# Patient Record
Sex: MALE | Race: WHITE
[De-identification: names, ages, dates, MRNs, and addresses within clinical notes are randomized per-mention and may not be internally consistent; named-entity substitution may affect disease eponyms.]

---

## 2018-06-15 ENCOUNTER — HOSPITAL ENCOUNTER (EMERGENCY)
Dept: HOSPITAL 68 - ERH | Age: 53
End: 2018-06-15
Payer: SELF-PAY

## 2018-06-15 VITALS — BODY MASS INDEX: 26.64 KG/M2 | HEIGHT: 73 IN | WEIGHT: 201 LBS

## 2018-06-15 VITALS — SYSTOLIC BLOOD PRESSURE: 161 MMHG | DIASTOLIC BLOOD PRESSURE: 92 MMHG

## 2018-06-15 DIAGNOSIS — Z79.84: ICD-10-CM

## 2018-06-15 DIAGNOSIS — I50.9: Primary | ICD-10-CM

## 2018-06-15 DIAGNOSIS — I10: ICD-10-CM

## 2018-06-15 DIAGNOSIS — E11.9: ICD-10-CM

## 2018-06-15 LAB
ABSOLUTE GRANULOCYTE CT: 8.1 /CUMM (ref 1.4–6.5)
BASOPHILS # BLD: 0 /CUMM (ref 0–0.2)
BASOPHILS NFR BLD: 0.3 % (ref 0–2)
EOSINOPHIL # BLD: 0.3 /CUMM (ref 0–0.7)
EOSINOPHIL NFR BLD: 2.8 % (ref 0–5)
ERYTHROCYTE [DISTWIDTH] IN BLOOD BY AUTOMATED COUNT: 16.6 % (ref 11.5–14.5)
GRANULOCYTES NFR BLD: 82 % (ref 42.2–75.2)
HCT VFR BLD CALC: 41.5 % (ref 42–52)
LYMPHOCYTES # BLD: 1 /CUMM (ref 1.2–3.4)
MCH RBC QN AUTO: 27.2 PG (ref 27–31)
MCHC RBC AUTO-ENTMCNC: 32.3 G/DL (ref 33–37)
MCV RBC AUTO: 84.2 FL (ref 80–94)
MONOCYTES # BLD: 0.4 /CUMM (ref 0.1–0.6)
PLATELET # BLD: 314 /CUMM (ref 130–400)
PMV BLD AUTO: 7.9 FL (ref 7.4–10.4)
RED BLOOD CELL CT: 4.93 /CUMM (ref 4.7–6.1)
WBC # BLD AUTO: 9.9 /CUMM (ref 4.8–10.8)

## 2018-06-15 NOTE — ED DYSPNEA/ASTHMA COMPLAINT
History of Present Illness
 
General
Chief Complaint: Dyspnea (COPD, CHF, Other)
Stated Complaint: SOB/SWELLING TO LOWER EXTREMITIES HX CHF
Source: patient, old records
Exam Limitations: no limitations
 
Vital Signs & Intake/Output
Vital Signs & Intake/Output
 Vital Signs
 
 
Date Time Temp Pulse Resp B/P B/P Pulse O2 O2 Flow FiO2
 
     Mean Ox Delivery Rate 
 
06/15 0943      94   
 
06/15 0913 97.1 102 18 161/92  97 Room Air Room Air 
 
 
 
Allergies
Coded Allergies:
Penicillins (PER PT WAS TOLD AS A KID 16)
tetanus toxoid, adsorbed (ARM WENT STIFF, COULDNT MOVE FOR A WEEK 16)
 
Reconcile Medications
Aspirin (Aspirin*) 81 MG TAB.CHEW   1 TAB PO DAILY HEART HEALTH  (Reported)
Atorvastatin Calcium 40 MG TABLET   1 TAB PO DAILY HIGH CHOLESTEROL
Carvedilol 3.125 MG TABLET   1 TAB PO BID HEART
Finasteride (Proscar) 5 MG TABLET   1 TAB PO DAILY Urinary Retention 
Furosemide 40 MG TABLET   1 TAB PO BID WATER RETENTION  (Reported)
Lisinopril 20 MG TABLET   20 MG PO DAILY HEART HEALTH
Metformin HCl (Glucophage) 1,000 MG TABLET   1 TAB PO BID Diabetes
Metolazone 2.5 MG TABLET   1 TAB PO QOD FLUID OVER LOAD
     TAKE 1 PILL EVERY OTHER DAY
Nystatin 100,000 UNIT/GRAM CREAM..G.   1 SALVATORE TOP BID ARM  (Reported)
     apply to affected area(s)
Oxycodone HCl/Acetaminophen (Percocet 5-325 MG Tablet) 5 MG-325 MG TABLET   1 
TAB PO BID PRN pain
Potassium Chloride (K-Tab ER) 20 MEQ TABLET.ER   1 TAB PO DAILY LOW POTASSIUM
 
Triage Nurses Notes Reviewed? yes
HPI:
Patient presents with worsening shortness of breath, dyspnea on exertion and 
orthopnea.  Patient has been taking his medications as prescribed.  Patient has 
been unable to go to the CHF clinic lately because of insurance reasons.  He 
denies any chest pain or chest tightness.  There is no nausea or vomiting.
 
Past History
 
Travel History
Traveled to [place] past 21 day No
 
Medical History
Any Pertinent Medical History? see below for history
Neurological: GUILLAIN-BARRE SYNDROME
EENT: NONE
Cardiovascular: CAD, CHF, hypertension, hyperlipidemia, myocardial infarction, 
MI, ,  with stents rheumatic fever
Respiratory: pneumonia, "FLUID IN LUNGS" CHF
Gastrointestinal: NONE
Hepatic: NONE
Renal: NONE
Musculoskeletal: NONE
Psychiatric: NONE
Endocrine: diabetes
Blood Disorders: NONE
Cancer(s): NONE
GYN/Reproductive: NONE
History of MRSA: No
History of VRE: No
History of CDIFF: No
 
Surgical History
Surgical History: coronary stents post myocardial infarction  AND 
 
Psychosocial History
Who do you live with Other (see notes)
Services at Home NONE
What is your primary language English
 
Family History
Family History, If Any:
MOTHER
  FH: breast cancer
Relation not specified for:
  *No pertinent family history
 
Hx Contributory? No
 
Review of Systems
 
Review of Systems
Constitutional:
Reports: no symptoms. 
EENTM:
Reports: no symptoms. 
Respiratory:
Reports: see HPI, orthopnea, short of breath. 
Cardiovascular:
Reports: no symptoms. 
GI:
Reports: no symptoms. 
Genitourinary:
Reports: no symptoms. 
Musculoskeletal:
Reports: no symptoms. 
Skin:
Reports: no symptoms. 
Neurological/Psychological:
Reports: no symptoms. 
Hematologic/Endocrine:
Reports: no symptoms. 
Immunologic/Allergic:
Reports: no symptoms. 
All Other Systems: Reviewed and Negative
 
Physical Exam
 
Physical Exam
General Appearance: well developed/nourished, alert, awake, anxious, moderate 
distress
Head: atraumatic, normal appearance
Eyes:
Bilateral: PERRL, EOMI. 
Ears, Nose, Throat: normal pharynx, normal ENT inspection, hearing grossly 
normal
Neck: normal inspection, supple, JVD
Respiratory: rales
Cardiovascular: regular rate/rhythm, normal peripheral pulses
Gastrointestinal: normal bowel sounds, soft, non-tender, no organomegaly
Extremities: normal capillary refill, pedal edema
Neurologic/Psych: no motor/sensory deficits, awake, alert, oriented x 3, normal 
mood/affect
Skin: intact, normal color, warm/dry
 
Core Measures
ACS in differential dx? No
CVA/TIA Diagnosis No
Sepsis Present: No
Sepsis Focused Exam Completed? No
 
Progress
Differential Diagnosis: AMI, CHF, pulmonary embolism, pneumonia
Plan of Care:
 Orders
 
 
Procedure Date/time Status
 
Add-on Test (ER Only) 06/15 1042 Active
 
MAGNESIUM 06/15 0930 Complete
 
Telemetry/Cardiac Monitor 06/15 09 Active
 
TROPONIN LEVEL 06/15 09 Complete
 
COMPREHENSIVE METABOLIC PANEL 06/15 09 Complete
 
CBC WITHOUT DIFFERENTIAL 06/15 0923 Complete
 
B-TYPE NATRIURETIC PEP (BNP) 06/15 0923 Complete
 
EKG 06/15 09 Active
 
 
 Laboratory Tests
 
 
 
06/15/18 0930:
Anion Gap 13, Estimated GFR > 60, BUN/Creatinine Ratio 17.0, Glucose 148  H, 
Calcium 9.4, Magnesium 1.5  L, Total Bilirubin 1.6  H, AST 20, ALT 18  L, 
Alkaline Phosphatase 86, Troponin I 0.04, Pro-B-Natriuretic Pept 9300  H, Total 
Protein 7.2, Albumin 3.9, Globulin 3.3, Albumin/Globulin Ratio 1.2, CBC w Diff 
NO MAN DIFF REQ, RBC 4.93, MCV 84.2, MCH 27.2, MCHC 32.3  L, RDW 16.6  H, MPV 
7.9, Gran % 82.0  H, Lymphocytes % 10.4  L, Monocytes % 4.5, Eosinophils % 2.8, 
Basophils % 0.3, Absolute Granulocytes 8.1  H, Absolute Lymphocytes 1.0  L, 
Absolute Monocytes 0.4, Absolute Eosinophils 0.3, Absolute Basophils 0
 
Diagnostic Imaging:
Viewed by Me: Radiology Read.  Discussed w/RAD: Radiology Read. 
CXR Impression: PATIENT: SHINE MORA III  MEDICAL RECORD NO: [id number] PRESENT 
AGE: 52  PATIENT ACCOUNT NO: [id number] : 63  LOCATION: [facility] ORDERING 
PHYSICIAN: Bossman Tomlinson MD     SERVICE DATE: 06/15/ EXAM TYPE: RAD 
- XRY-PORTABLE CHEST XRAY EXAMINATION: XR PORTABLE CHEST CLINICAL INFORMATION: 
Pulmonary edema. COMPARISON: Chest x-ray 2017. TECHNIQUE: Portable 85 
degrees semierect frontal view of the chest was obtained. FINDINGS: The right 
hemithorax is hypoexpanded compared to the left. There is a right-sided moderate
pleural effusion, with underlying atelectasis and/or consolidation. The left 
lung appears well-expanded and clear. The cardiac silhouette is prominent. There
is mild prominence of the central pulmonary vasculature. No pneumothoraces are 
seen. The osseous structures are unremarkable. IMPRESSION: 1. There is 
cardiomegaly, and the central pulmonary vasculature is mildly prominent. 2. 
There is a right-sided pleural effusion with underlying atelectasis/
consolidation. DICTATED BY: Eliel Cavazos MD  DATE/TIME DICTATED:06/15/18 / 0955 
:RAD.CAMPBELL  DATE/TIME TRANSCRIBED:06/15/18 / 0955 CONFIDENTIAL, 
DO NOT COPY WITHOUT APPROPRIATE AUTHORIZATION.  <Electronically signed in Other 
Vendor System>                                                                  
                     SIGNED BY: Eliel Cavazos MD 06/15/18 1001
Initial ED EKG: SR, IVCD, NSSTT CHANGES, NO CHANGE FROM PRIOR
Comments:
Discussed with Dr. Stephens, he will see the patient in the emergency department 
for consultation.
 
Departure
 
Departure
Disposition: HOME OR SELF CARE
Condition: Stable
Clinical Impression
Primary Impression: CHF (congestive heart failure)
Referrals:
Zeenat Galaviz DO (PCP/Family)
 
Angelo MORAN,LEA Allen
 
Additional Instructions:
TAKE METOLAZONE EVERY OTHER DAY FOR 20 DAYS
TAKE POTASSIUM DAILY FOR 1 WEEK
FOLLOW UP WITH 
RETURN FOR ANY CONCERNS
Departure Forms:
Customer Survey
General Discharge Information
Prescriptions:
Current Visit Scripts
Metolazone 1 TAB PO QOD  
     #10 TAB 
     TAKE 1 PILL EVERY OTHER DAY
 
Potassium Chloride (K-Tab ER) 1 TAB PO DAILY  
     #7 TAB 
 
 
 
Critical Care Note
 
Critical Care Note
Critical Care Time: non-applicable

## 2018-06-15 NOTE — RADIOLOGY REPORT
EXAMINATION:
XR PORTABLE CHEST
 
CLINICAL INFORMATION:
Pulmonary edema.
 
COMPARISON:
Chest x-ray 12/01/2017.
 
TECHNIQUE:
Portable 85 degrees semierect frontal view of the chest was obtained.
 
FINDINGS:
The right hemithorax is hypoexpanded compared to the left. There is a
right-sided moderate pleural effusion, with underlying atelectasis and/or
consolidation. The left lung appears well-expanded and clear. The cardiac
silhouette is prominent. There is mild prominence of the central pulmonary
vasculature. No pneumothoraces are seen. The osseous structures are
unremarkable.
 
IMPRESSION:
1. There is cardiomegaly, and the central pulmonary vasculature is mildly
prominent.
 
2. There is a right-sided pleural effusion with underlying
atelectasis/consolidation.

## 2018-07-23 ENCOUNTER — HOSPITAL ENCOUNTER (INPATIENT)
Dept: HOSPITAL 68 - ERH | Age: 53
LOS: 4 days | DRG: 292 | End: 2018-07-27
Admitting: INTERNAL MEDICINE
Payer: COMMERCIAL

## 2018-07-23 VITALS — BODY MASS INDEX: 25.64 KG/M2 | WEIGHT: 193.5 LBS | HEIGHT: 73 IN

## 2018-07-23 DIAGNOSIS — E87.6: ICD-10-CM

## 2018-07-23 DIAGNOSIS — Z95.5: ICD-10-CM

## 2018-07-23 DIAGNOSIS — Z56.0: ICD-10-CM

## 2018-07-23 DIAGNOSIS — I25.10: ICD-10-CM

## 2018-07-23 DIAGNOSIS — I25.2: ICD-10-CM

## 2018-07-23 DIAGNOSIS — I47.2: ICD-10-CM

## 2018-07-23 DIAGNOSIS — I11.0: Primary | ICD-10-CM

## 2018-07-23 DIAGNOSIS — J90: ICD-10-CM

## 2018-07-23 DIAGNOSIS — I50.23: ICD-10-CM

## 2018-07-23 LAB
ABSOLUTE GRANULOCYTE CT: 9 /CUMM (ref 1.4–6.5)
BASOPHILS # BLD: 0 /CUMM (ref 0–0.2)
BASOPHILS NFR BLD: 0.2 % (ref 0–2)
EOSINOPHIL # BLD: 0.1 /CUMM (ref 0–0.7)
EOSINOPHIL NFR BLD: 1.2 % (ref 0–5)
ERYTHROCYTE [DISTWIDTH] IN BLOOD BY AUTOMATED COUNT: 19.6 % (ref 11.5–14.5)
GRANULOCYTES NFR BLD: 84.4 % (ref 42.2–75.2)
HCT VFR BLD CALC: 42.3 % (ref 42–52)
LYMPHOCYTES # BLD: 1 /CUMM (ref 1.2–3.4)
MCH RBC QN AUTO: 26.1 PG (ref 27–31)
MCHC RBC AUTO-ENTMCNC: 32 G/DL (ref 33–37)
MCV RBC AUTO: 81.5 FL (ref 80–94)
MONOCYTES # BLD: 0.5 /CUMM (ref 0.1–0.6)
PLATELET # BLD: 272 /CUMM (ref 130–400)
PMV BLD AUTO: 8.5 FL (ref 7.4–10.4)
RED BLOOD CELL CT: 5.19 /CUMM (ref 4.7–6.1)
WBC # BLD AUTO: 10.7 /CUMM (ref 4.8–10.8)

## 2018-07-23 PROCEDURE — 87075 CULTR BACTERIA EXCEPT BLOOD: CPT

## 2018-07-23 SDOH — ECONOMIC STABILITY - INCOME SECURITY: UNEMPLOYMENT, UNSPECIFIED: Z56.0

## 2018-07-23 NOTE — ED GENERAL ADULT
History of Present Illness
 
General
Chief Complaint: Dyspnea (COPD, CHF, Other)
Stated Complaint: SOB
Source: patient
Exam Limitations: no limitations
 
Vital Signs & Intake/Output
Vital Signs & Intake/Output
 Vital Signs
 
 
Date Time Temp Pulse Resp B/P B/P Pulse O2 O2 Flow FiO2
 
     Mean Ox Delivery Rate 
 
 2139 97.4 95 18 169/97  100 Nasal 2.0L 
 
       Cannula  
 
 1844 97.6 96 18 171/93  100 Nasal 2.5L 
 
       Cannula  
 
 1406  103 18 159/98  95 Room Air  
 
 
 
Allergies
Coded Allergies:
Penicillins (PER PT WAS TOLD AS A KID 16)
tetanus toxoid, adsorbed (ARM WENT STIFF, COULDNT MOVE FOR A WEEK 16)
 
Reconcile Medications
Aspirin (Aspirin*) 81 MG TAB.CHEW   1 TAB PO DAILY HEART HEALTH  (Reported)
Atorvastatin Calcium 40 MG TABLET   1 TAB PO DAILY HIGH CHOLESTEROL
Carvedilol 3.125 MG TABLET   1 TAB PO BID HEART
Finasteride (Proscar) 5 MG TABLET   1 TAB PO DAILY Urinary Retention 
Furosemide 40 MG TABLET   1 TAB PO BID WATER RETENTION  (Reported)
Lisinopril 20 MG TABLET   20 MG PO DAILY HEART HEALTH
Metformin HCl (Glucophage) 1,000 MG TABLET   1 TAB PO BID Diabetes
Methylprednisolone 4 MG TAB.DS.PK   1 TAB PO DAILY STEROID  (Reported)
Oxycodone HCl/Acetaminophen (Percocet 5-325 MG Tablet) 5 MG-325 MG TABLET   1 
TAB PO BID PRN pain
 
Triage Nurses Notes Reviewed? yes
Onset: Gradual
Duration: day(s):
Timing: recent history
HPI:
 
 
 
 
18
7 PM
53-year-old male presents to the emergency department complaining of progressive
difficulty breathing and leg swelling.  He says he has a history of Guillain-
Barr syndrome and congestive heart failure.  He denies any chest pain or fever.
(Gucci Martinez DO)
 
Past History
 
Medical History
Any Pertinent Medical History? see below for history
Neurological: GUILLAIN-BARRE SYNDROME
EENT: NONE
Cardiovascular: CAD, CHF, hypertension, hyperlipidemia, myocardial infarction, 
MI, ,  with stents rheumatic fever
Respiratory: pneumonia, "FLUID IN LUNGS" CHF
Gastrointestinal: NONE
Hepatic: NONE
Renal: NONE
Musculoskeletal: NONE
Psychiatric: NONE
Endocrine: diabetes
Blood Disorders: NONE
Cancer(s): NONE
GYN/Reproductive: NONE
History of MRSA: No
History of VRE: No
History of CDIFF: No
 
Surgical History
Surgical History: coronary stents post myocardial infarction  AND 
 
Psychosocial History
Who do you live with Other (see notes)
Services at Home NONE
What is your primary language English
 
Family History
Family History, If Any:
MOTHER
  FH: breast cancer
Relation not specified for:
  *No pertinent family history
 
Hx Contributory? No
(Gucci Martinez DO)
 
Review of Systems
 
Review of Systems
Constitutional:
Denies: fever. 
EENTM:
Denies: visual changes. 
Respiratory:
Reports: short of breath. 
Cardiovascular:
Denies: chest pain. 
GI:
Denies: abdominal pain. 
Genitourinary:
Reports: no symptoms. 
Musculoskeletal:
Reports: no symptoms. 
Skin:
Denies: rash. 
Neurological/Psychological:
Reports: see HPI. 
Hematologic/Endocrine:
Reports: no symptoms. 
Immunologic/Allergic:
Reports: no symptoms. 
(Gucci Martinez DO)
 
Physical Exam
 
Physical Exam
General Appearance: well developed/nourished, alert, awake, mild distress
Head: atraumatic, normal appearance
Eyes:
Bilateral: normal appearance, PERRL, EOMI. 
Ears, Nose, Throat: normal ENT inspection
Neck: normal inspection
Respiratory: decreased breath sounds, crackles (fine, at bases)
Cardiovascular: regular rate/rhythm
Peripheral Pulses:
4+ radial (R), 4+ radial (L)
Gastrointestinal: tenderness
Back: normal inspection
Extremities: bilateral edema to lower extremites
Neurologic/Psych: no motor/sensory deficits, awake, alert, oriented x 3
Skin: intact, normal color, warm/dry
 
Core Measures
ACS in differential dx? No
CVA/TIA Diagnosis: No
Sepsis Present: No
Sepsis Focused Exam Completed? No
(Gucci Martinez DO)
 
Progress
Differential Diagnoses
I considered the following diagnoses in my evaluation of the patient: [CHF, 
pulmonary embolism, acute coronary syndrome, pneumonia, COPD, pulmonary 
hypertension, bronchitis
 
Plan of Care:
 Orders
 
 
Procedure Date/time Status
 
Regular Diet  B Active
 
Patient Data 2128 Active
 
OXYGEN SETUP (GEN) 2057 Active
 
Saline Lock 2057 Active
 
Admit to inpatient 2057 Active
 
Vital Signs 2057 Active
 
Activity/Ambulation 2057 Active
 
BLOOD CULTURE 2057 Active
 
Code Status 2057 Active
 
TROPONIN LEVEL  190 Complete
 
EKG  190 Active
 
FingerStick- Glucose  1848 Active
 
Intake & Output  1528 Active
 
OXYGEN SETUP (GEN)  1450 Active
 
Telemetry/Cardiac Monitor  1450 Active
 
Saline Lock  1411 Active
 
TROPONIN LEVEL  1411 Complete
 
D-DIMER  1411 Complete
 
COMPREHENSIVE METABOLIC PANEL  1411 Complete
 
CBC WITHOUT DIFFERENTIAL  141 Complete
 
EKG  1358 Active
 
 
 Current Medications
 
 
  Sig/John Start time  Last
 
Medication Dose  Stop Time Status Admin
 
Azithromycin 500 MG ONCE ONE  2100 AC 
 
(Zithromax)   2159  
 
Sodium Chloride 250 ML    
 
(Normal Saline 0.9%)     
 
 
 Laboratory Tests
 
 
 
18 1939:
Troponin I 0.05
 
18 1600:
Anion Gap 13, Estimated GFR > 60, BUN/Creatinine Ratio 17.8, Glucose 100  H, 
Calcium 9.1, Total Bilirubin 2.5  H, AST 19, ALT 23, Alkaline Phosphatase 85, 
Troponin I 0.06, Total Protein 7.2, Albumin 4.1, Globulin 3.1, Albumin/Globulin 
Ratio 1.3, D-Dimer High Sensitivty 589  H, CBC w Diff NO MAN DIFF REQ, RBC 5.19,
MCV 81.5, MCH 26.1  L, MCHC 32.0  L, RDW 19.6  H, MPV 8.5, Gran % 84.4  H, 
Lymphocytes % 9.3  L, Monocytes % 4.9, Eosinophils % 1.2, Basophils % 0.2, 
Absolute Granulocytes 9.0  H, Absolute Lymphocytes 1.0  L, Absolute Monocytes 
0.5, Absolute Eosinophils 0.1, Absolute Basophils 0
 Microbiology
2120  BLOOD: Blood Culture - RECD
2108  BLOOD: Blood Culture - RECD
 
 
Initial ED EKG: LVH, sinus tach
Prior EKG: unchanged
Comments:
PATIENT: SHINE MORA III  MEDICAL RECORD NO: [id number]
PRESENT AGE: 53  PATIENT ACCOUNT NO: [id number]
: 63  LOCATION: [facility]
ORDERING PHYSICIAN: Gucci Martinez DO  
 
  SERVICE DATE: 18-
EXAM TYPE: RAD - XRY-CHEST XRAY, TWO VIEWS
 
EXAMINATION:
XR CHEST
 
CLINICAL INFORMATION:
Shortness of breath
 
COMPARISON:
2017 and 06/15/2018
 
TECHNIQUE:
2 views of the chest were obtained.
 
FINDINGS:
Mild cardiomegaly; also, there is generalized prominence of the pulmonary
vasculature without overt edema. Small right pleural effusion, similar
compared to 06/15/2018, associated with compressive atelectasis in the right
lower lobe and middle lobe. The visualized bones are intact.
 
IMPRESSION:
- Cardiomegaly and pulmonary vascular congestion without overt interstitial
edema.
 
- Small right pleural effusion and compressive atelectasis in right lung base
remain similar in appearance compared to 06/15/2018.
 
DICTATED BY: Wili Enriquez MD 
DATE/TIME DICTATED:18
:IRINEO 
DATE/TIME TRANSCRIBED:18
 
CONFIDENTIAL, DO NOT COPY WITHOUT APPROPRIATE AUTHORIZATION.
 
 <Electronically signed in Other Vendor System>                                 
          
                                           SIGNED BY: Wili Enriquez MD 18
9455
 
 
 
(Gucci Martinez DO)
Diagnostic Imaging:
Viewed by Me: CT Scan.  Discussed w/RAD: CT Scan. 
Radiology Impression: 1. Consolidations involving the right middle lobe and 
right lower lobe limit evaluation for pulmonary embolus in these regions. No 
pulmonary emboli are identified outside the lung lobes. 2. Moderate right 
pleural effusion. The consolidations within the right middle lobe and right 
lower lobe may be at least partially on the basis of compression although the 
patchy nature and air bronchograms suggest there may been an underlying 
infectious process. This appears very similar when compared with the prior study
from 2016. 3. Stable mediastinal lymphadenopathy. 4. Previously described 
pulmonary nodules are poorly evaluated due to the underlying lung parenchymal 
disease on the current study. There is apparent increasing size of a left 
anterior upper lobe nodule. Recommend dedicated chest CT once the patient is 
recovered from this acute episode to assess the previously described pulmonary 
nodules. 5. Unchanged calcification within the left ventricle, of uncertain 
clinical significance or etiology. VTE: negative.
CXR Impression:  Cardiomegaly and pulmonary vascular congestion without overt 
interstitial edema. - Small right pleural effusion and compressive atelectasis 
in right lung base remain similar in appearance compared to 06/15/2018.
Comments:
 
Updated patient with diagnostics, CTA.  He is refusing hospitalization at this 
time citing lack of insurance.
After speaking with his mother he decided it would be in his best interest to 
stay for treatment of pneumonia, pleural effusion and CHF.
(Scott MORAN,Vitaliy)
 
Departure
 
Departure
Disposition: STILL A PATIENT
Condition: Stable
Referrals:
Zeenat Galaviz DO (PCP/Family)
 
Departure Forms:
Customer Survey
General Discharge Information
Comments
 
 
 
 
The patient's chest x-ray shows mild pulmonary vascular congestion, the symptoms
were progressive.  No chest pain.  No fever.  He does have significant lower 
extremity edema.  40 mg of IV Lasix was given.  He had an elevated d-dimer and 
so CTA was obtained.  He does not want to be admitted to the hospital.  IV 
potassium was given.  Repeat troponin and EKG were ordered.  The patient was 
signed out to Dr. Chavez at 7 PM.
(Gucci Martinez DO)
 
Departure
Clinical Impression
Primary Impression: Pneumonia
Secondary Impressions: CHF (congestive heart failure), Hypokalemia, Pleural 
effusion associated with pulmonary infection
 
Admission Note
Spoke With:
Sayda Chung MD
Documentation of Exam:
Documentation of any treatments & extenuating circumstances including Concerns 
Regarding Discharge (functional status, medication knowledge or non-compliance, 
living conditions, etc.) that warrant an admission rather than observation: IV 
antibiotics IV diuresis follow blood cultures serial lab exam cardiac monitoring
education adjustment cardiology evaluation pulmonary evaluation continuing care 
discharge planning
 
(Vitaliy Chavez MD)
 
Critical Care Note
 
Critical Care Note
Critical Care Time: non-applicable
(Gucci Martinez DO)
 
Critical Care Note
Critical Care Time: 30-74 min (35)
(Vitaliy Chavez MD)
 
ED Attending Observation
Initial Observation Note:
I have seen and personally examined SHINE MORA III 
on 18 at 1559. 
 
I agree with the current emergency department documentation.  The disposition 
(admission or discharge) is uncertain at this time, he needs a period of 
observation for the following reason(s): 
 
The ED Nurse caring for this patient has been personally informed as to what the
patient is being observed for.
 
(Gucci Martinez DO)

## 2018-07-23 NOTE — HISTORY & PHYSICAL
Rakesh Saini 07/23/18 2242:
General Information and HPI
MD Statement:
I have seen and personally examined SHINE MORA III and documented this H&P.
 
The patient is a 53 year old M who presented with a patient stated chief 
complaint of [shortness of breath].
 
Source of Information: patient
Exam Limitations: no limitations
History of Present Illness:
The patient is a 53-year-old male who is here with past medical history of COPD,
MI (2000 9-2014 with a stent placement), CHF (LVEF 25-30%), diabetes mellitus, 
with chief complaint of dyspnea and shortness of breath and progressive swelling
in lower extremities bilaterally.
He felt relatively well until 4 days ago when looked shortness of breath and he 
was not able to go to sleep for the last 3 nights due to orthopnea.  During the 
past day he had severe dyspnea and orthopnea and he also has shortness of breath
at rest while he is sitting.  He reports that he has central pressure-like chest
pain which is 4/10 while he is sleeping and worsens when he lies back to a score
of 8/10.  The pain is dull ache when he is sitting but when he lies down it 
becomes pressure-like tightness in the center of his chest.  
He has been having a productive cough with white foamy phlegm.  He also 
complains about periumbilical abdominal pain.  He reports lower extremity 
swelling and in the past few days it has been aggravating and he has been 
feeling pain in his shins bilaterally. 
Regarding his chest pain he says that the pain and its quality reminds him of 
his last MI that he had in 2014. 
His cardiologist has suggested defibrillator team but he has rejected it.
During the interview he was experiencing some pain along the IV line in his 
right arm due to the potassium infusion.
He reports palpitation and heartburn.
He reports no fever, chills, hemoptysis, chest pain, dizziness, lightheadedness.
 
Past medical history:
Coronary artery disease, CHF, hypertension, hyperlipidemia, myocardial 
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
, diabetes mellitus (on metformin), rheumatic heart fever
 
Family history:
Mother had a history of breast cancer otherwise not significant
 
Past surgical history:
Stent placement post MI 2009 and 2014
 
Allergies:
Penicillin (he was told he is allergic to penicillin as a child)
Tetanus toxoid (he could not move for a week after injection)
 
Social history:
He lives alone at home with his cat.  He used to smoke more than 1 PPD 
cigarettes and he quit a couple of years ago.  He drinks occasionally and denies
using recreational drugs
 
Labs:
D-dimer: 589
Sodium: 142
Potassium: 3.0
Calcium: 9.1
Total bilirubin: 2.5
Troponin: 0.06
 
EKG:
Previous: Previous EKG showed a heart rate of 100 beats axis deviation to the 
left side LVH, RBBB, anterior fascicular block, IVCD,
Today's EKG at 14: 03 showed sinus tachycardia 104, otherwise no changes were 
observed
Today's EKG at 19: 48, showed no changes comparing to the previous EKG
 
Imaging:
Chest x-ray: Chest x-ray showed cardiomegaly and pulmonary vascular congestion 
without overt interstitial edema
Chest CT:1. Consolidations involving the right middle lobe and right lower lobe 
limit evaluation for pulmonary embolus in these regions. No pulmonary emboli are
identified outside the lung lobes. 2. Moderate right pleural effusion. The 
consolidations within the right middle lobe and right lower lobe may be at least
partially on the basis of compression although the patchy nature and air 
bronchograms suggest there may been an underlying infectious process. This 
appears very similar when compared with the prior study from 11/25/2016. 3. 
Stable mediastinal lymphadenopathy. 4. Previously described pulmonary nodules 
are poorly evaluated due to the underlying lung parenchymal disease on the 
current study. There is apparent increasing size of a left anterior upper lobe 
nodule. Recommend dedicated chest CT once the patient is recovered from this 
acute episode to assess the previously described pulmonary nodules. 5. Unchanged
calcification within the left ventricle, of uncertain clinical significance or 
etiology.
VTE: negative.
 
 
Allergies/Medications
Allergies:
Coded Allergies:
Penicillins (PER PT WAS TOLD AS A KID 06/22/16)
tetanus toxoid, adsorbed (ARM WENT STIFF, COULDNT MOVE FOR A WEEK 08/09/16)
 
Home Med list
Aspirin (Aspirin*) 81 MG TAB.CHEW   1 TAB PO DAILY HEART HEALTH  (Reported)
Atorvastatin Calcium 40 MG TABLET   1 TAB PO DAILY HIGH CHOLESTEROL
Carvedilol 3.125 MG TABLET   1 TAB PO BID HEART
Finasteride (Proscar) 5 MG TABLET   1 TAB PO DAILY Urinary Retention 
Furosemide 40 MG TABLET   1 TAB PO BID WATER RETENTION  (Reported)
Lisinopril 20 MG TABLET   20 MG PO DAILY HEART HEALTH
Metformin HCl (Glucophage) 1,000 MG TABLET   1 TAB PO BID Diabetes
Methylprednisolone 4 MG TAB.DS.PK   1 TAB PO DAILY STEROID  (Reported)
Oxycodone HCl/Acetaminophen (Percocet 5-325 MG Tablet) 5 MG-325 MG TABLET   1 
TAB PO BID PRN pain
 
 
Past History
 
Travel History
Traveled to [place] past 21 day No
 
Medical History
Neurological: GUILLAIN-BARRE SYNDROME
EENT: NONE
Cardiovascular: CAD, CHF, hypertension, hyperlipidemia, myocardial infarction, 
MI, 2009, 2014 with stents rheumatic fever
Respiratory: pneumonia, "FLUID IN LUNGS" CHF
Gastrointestinal: NONE
Hepatic: NONE
Renal: NONE
Musculoskeletal: NONE
Psychiatric: NONE
Endocrine: diabetes
Blood Disorders: NONE
Cancer(s): NONE
GYN/Reproductive: NONE
History of MRSA: No
History of VRE: No
History of CDIFF: No
 
Surgical History
Surgical History: coronary stents post myocardial infarction 2009 AND 2014
 
Past Family/Social History
 
Family History
Relations & Conditions if any
MOTHER
  FH: breast cancer
Relation not specified for:
  *No pertinent family history
 
 
Psychosocial History
Who Do You Live With? roommates
Services at Home: NONE
 
Review of Systems
 
Review of Systems
Constitutional:
Reports: see HPI.  Denies: chills, fever. 
EENTM:
Reports: see HPI.  Denies: visual changes. 
Cardiovascular:
Reports: see HPI, orthopena, palpitations, peripheral edema. 
Respiratory:
Reports: see HPI, cough, short of breath, sputum production. 
GI:
Reports: see HPI, abdominal pain. 
Genitourinary:
Reports: see HPI. 
Musculoskeletal:
Reports: see HPI. 
Skin:
Reports: see HPI. 
Neurological/Psychological:
Reports: see HPI. 
Hematologic/Endocrine:
Reports: see HPI. 
Immunologic/Allergic:
Reports: see HPI. 
 
Exam & Diagnostic Data
Last 24 Hrs of Vital Signs/I&O
 Vital Signs
 
 
Date Time Temp Pulse Resp B/P B/P Pulse O2 O2 Flow FiO2
 
     Mean Ox Delivery Rate 
 
07/23 2309  100 18 163/9  99 Nasal 2.0L 
 
       Cannula  
 
07/23 2139 97.4 95 18 169/97  100 Nasal 2.0L 
 
       Cannula  
 
07/23 1844 97.6 96 18 171/93  100 Nasal 2.5L 
 
       Cannula  
 
07/23 1406  103 18 159/98  95 Room Air  
 
 
 Intake & Output
 
 
 07/24 0800 07/24 0000 07/23 1600
 
Intake Total   
 
Output Total  1000 600
 
Balance  -1000 -600
 
    
 
Output, Urine  1000 600
 
Patient   190 lb
 
Weight   
 
Weight   Reported by Patient
 
Measurement   
 
Method   
 
 
 
 
Physical Exam
General Appearance Alert, Oriented X3, Cooperative, Mild Distress
Skin No Rashes, No Breakdown, No Significant Lesion
Skin Temp/Moisture Exam: Warm/Dry
Sepsis Skin Exam (color): Normal for Ethnicity
HEENT Atraumatic, PERRLA, EOMI
Neck Supple, No LAD, JVD 8 cmH2O
Cardiovascular Regular Rate, Normal S1, Normal S2
Lungs decreased breathing sounds on the lower half of right lung with bilateral 
rales in lower lobes more on right
Abdomen Normal Bowel Sounds, Soft, No Tenderness
Neurological Normal Speech, Strength at 5/5 X4 Ext, Normal Tone, Sensation 
Intact, Cranial Nerves 3-12 NL
Extremities No Clubbing, No Cyanosis, Normal Pulses, 4+ pitting edema on both 
lower extremities with tightness, up to the level of knees  knees
Vascular Normal Pulses, Pulses Symmetrical
Sepsis Peripheral Pulse Location: Radial
Sepsis Peripheral Pulse Exam: Normal
Sepsis Cap Refill Exam: <2 Sec
 
Assessment/Plan
Assessment:
The patient is a 53-year-old man with significant past medical history for 2 
episodes of MI with a stent placement, heart failure with low ejection fraction,
who presents to the ED due to aggravated shortness of breath and lower extremity
edema.
During the examination the patient had orthopnea and he reports that he had 
worsening orthopnea for the past 3 days.  In physical examination he was seen a 
stress and shortness of breath while sitting on the bed.  He had a JVP of 8 cm, 
bilateral crackles, and 4+ pitting bilateral lower extremity edema.  His lab 
results showed white BC of 10.7, low potassium of 3.0, troponin levels of 0.05 
and 0.06, d-dimer was 589 point chest imaging showed right pleural effusion, no 
pulmonary embolism, and congestion of pulmonary vasculature.  No acute changes 
are observed on EKG comparing to the previous EKG.
Based on history and physical examination the patient seems to have acute on 
chronic heart failure with reduced ejection fraction which might be induced by 
pneumonia and pleural effusion.  The patient has dyspnea and orthopnea at rest 
and is classified as NYHA class IV.  The patient is admitted to telemetry for 
close monitoring serial EKGs and troponins, consult with cardiologist, empiric 
antibiotic for possible pneumonia which is underlying cause of his condition and
presence of his heart failure.
 
Problem list:
Acute on chronic heart failure with low ejection fraction
Possible pneumonia with right-sided pleural effusion
4+ lower extremity pitting edema
Multiple episodes of CAD with stent placement
Reduced ejection fraction of 30-35 with heart failure
Limited access to medical care due to insurance problems
Hypertension
Hyperlipidemia
Diabetes mellitus
 
As Ranked By This Provider
Problem List:
 1. HFrEF (heart failure with reduced ejection fraction)
 
 2. Pleural effusion associated with pulmonary infection
 
 3. CAD (coronary artery disease)
 
 4. Hypertension
 
 5. Diabetes mellitus
 
 
Core Measures/Misc (9/17)
 
Acute Coronary Syndrome
ACS Diagnosis: No
 
Congestive Heart Failure
Congestive Heart Failure Diagnosis Yes
Last Known EF % 30
 
Cerebrovascular Accident
CVA/TIA Diagnosis: No
 
VTE (View Protocol)
VTE Risk Factors Age>40
No Mechanical VTE Prophylaxis d/t N/A MechProphylax Ordered
No VTE Pharm Prophylaxis d/t NA PharmProphylax ordered
 
Sepsis (View protocol)
Sepsis Present: No
If YES complete Sepsis Event Note If YES complete Sepsis Event Note
 
 
Tesfaye Francois MD 07/23/18 2243:
Core Measures/Misc (9/17)
 
Sepsis (View protocol)
If YES complete Sepsis Event Note If YES complete Sepsis Event Note
 
Resident Review Statement
Resident Statement: examined this patient, discussed with intern, agreed with 
intern, reviewed EMR data (avail)
Other Findings:
History of present illness
53-year-old man with past medical history of CAD/MI status post PCI (2009, 2014,
2016) with 8X JUDITH most recently 2x LAD and 2x 2 RCA, HFrEF (LVEF 30-35%), 
diabetes mellitus, hypertension, hyperlipidemia, and Guillain-Barr syndrome 
seen for evaluation of progressively worsening shortness of breath and lower 
extremity swelling.
 
Patient reports over the past 3 days he has been progressively more short of 
breath and has been unable to lie flat.  When he does he is not able to breathe 
and develops a moderate central chest discomfort.  At baseline he is able to 
ambulate at least 50 feet without developing any shortness of breath or chest 
pain.  Over the past 3 days he has been unable to ambulate even short distances 
without symptoms which now occur at even rest.  He admits to a new productive 
cough of white foamy phlegm.  For further evaluation of these complaints he came
to the [facility] ED.
 
Presently he admits to 4/10 nonradiating central chest pressure that is worse 
when he lies flat increasing to 8/10 described as a dull ache without any 
aggravating/alleviating factors.  He reports that he is compliant with his 
medications but does not follow up with his cardiologist Dr. Stephens as he has 
insurance issues.  Additionally he admits to some vague abdominal pain and pain 
in his lower extremities.
 
Review of systems
He otherwise denies any headache, fever, chills, vision change, lightheadedness,
dizziness, heartburn, palpitations, nausea, vomiting, diarrhea, constipation, 
urinary symptoms, numbness, tingling, or weakness.
 
Objective
Vitals-97.4-97.6, HR , RR 18, -171/93-98, O2 % on 2.0 L via 
nasal cannula
Physical exam
-General: Ill-appearing middle-aged  man in no acute distress
-HEENT: NCAT, PERRLA, EOMI, anicteric sclera, moist mucous membranes
-Neck: Supple, JVP ~8 cm H2O, trachea midline, no accessory respiratory muscle 
use
-Cardio: 2/6 systolic murmur; regular rate and rhythm
-Pulmonary: Diminished right lung field airflow with crackles up to upper chest
-Abdomen: Soft, mildly distended, nontender, bowel sounds intact
-Neuro: Awake and alert, cranial nerves II through XII grossly intact
-Extremities: 4+ bilateral lower extremity pitting edema, normal pulses
 
Labs/imaging/studies
-CBC: WBC 10.7, hemoglobin 13.9, hematocrit 42.3, platelet 272
-BMP: Sodium 142, potassium 3.0, chloride 99, CO2 30, BUN 16, creatinine 0.9, 
anion gap 13, glucose 100
-LFT: T bili 2.5, otherwise within normal limits
-Miscellaneous: Troponin I 0.05/0.06, d-dimer 589
-EKG 6/15/18: NSR, LAFB, RBBB
-EKG 7/23/18: Sinus tachycardia, LAFB, RBBB
-Echocardiogram 12/14/16: LVEF 30-35% without regional wall motion abnormalities
, moderate tricuspid regurgitation, mild pulmonary hypertension
-CXR:
* Cardiomegaly and pulmonary vascular congestion without overt interstitial
 edema.
* Small right pleural effusion and compressive atelectasis in right lung base
 remain similar in appearance compared to 06/15/2018.
-CTA chest with PE protocol:
 1. Consolidations involving the right middle lobe and right lower lobe limit
 evaluation for pulmonary embolus in these regions. No pulmonary emboli are
 identified outside the lung lobes. 
 2. Moderate right pleural effusion. The consolidations within the right
 middle lobe and right lower lobe may be at least partially on the basis of
 compression although the patchy nature and air bronchograms suggest there may
 been an underlying infectious process. This appears very similar when
 compared with the prior study from 11/25/2016. 
 3. Stable mediastinal lymphadenopathy. 
 4. Previously described pulmonary nodules are poorly evaluated due to the
 underlying lung parenchymal disease on the current study. There is apparent
 increasing size of a left anterior upper lobe nodule. Recommend dedicated
 chest CT once the patient is recovered from this acute episode to assess the
 previously described pulmonary nodules.
 5. Unchanged calcification within the left ventricle, of uncertain clinical
 significance or etiology.
 VTE: negative.
 
Assessment
53-year-old man with multiple medical problems significant for an extensive 
cardiovascular history including multiple PCI with drug-eluting stents and heart
failure with reduced ejection fraction seen for evaluation of progressively 
worsening shortness of breath now present at rest with lower extremity swelling.
 
Presently patient feels short of breath at rest with mild central nonradiating 
chest pain.  Vital signs are significant for elevated systolic blood pressure 
ranging 159-171.  Physical exam reveals an ill-appearing middle-aged man with 
elevated JVP, pulmonary crackles, and 4+ bilateral lower extremity swelling.  
Significant labs include WBC 10.7, K3.0, troponin I 0.05/0.06, d-dimer 589.  CXR
demonstrates cardiomegaly with pulmonary vascular congestion and a small right 
pleural effusion however a CTA chest with PE protocol demonstrates a right 
middle lobe/right lower lobe consolidation with moderate sized right pleural 
effusion and no pulmonary embolism.  EKG has no acute change from previous.  
Patient received ceftriaxone and azithromycin after blood cultures were drawn in
the ED in addition to 40 mg of IV Lasix.
 
Clinically patient appears to have acute on chronic heart failure with reduced 
ejection fraction in the context of RML/RLL pneumonia with moderate right 
pleural effusion.  Patient now meets criteria for New York Heart Association 
class IV heart failure with symptoms at rest and a last recorded LVEF ranging 30
-35%.  Patient has reportedly declined a defibrillator in the past.  He may 
benefit from an aldosterone antagonist.  Patient has known coronary artery 
disease and is at high risk patient for further acute coronary events given his 
multiple medical comorbidities and extensive coronary artery calcifications. 
Patient is being admitted to the telemetry floor for telemetry monitoring, 
serial troponin/EKG, echocardiogram, intravenous antibiotics and diuretics, and 
cardiology consultation.
 
Problem List
-Acute on chronic heart failure with reduced ejection fraction
-Community Acquired pneumonia
-Moderate Right pleural effusion
-Chest pain, likely due to hypervolemia
-Hypokalemia
-0.6 cm left upper lobe pulmonary nodule, previously 0.3 cm
-0.4 cm left lower lobe nodule, stable
-CAD/MI/PCI (2009, 2014, 2016) status post JUDITH 8
-History of HFrEF, LVEF 30-35% (2016)
-diabetes mellitus, previous HbA1c 14.9 currently not on insulin
-Hypertension
-Hyperlipidemia
-History of Guillain-Barr syndrome
 
Plan
-Admit to telemetry floor
-Telemetry monitoring
-Strict I&Os, daily weight
-Total respiratory care
-Supplemental oxygen, goal > 92% taper as tolerated
-Accuchecks TIDAC/HS, Q6H while NPO
-Lasix 40 mg IV BID, titrate to net negative 1-2L/day
-Ceftriaxone 1 g IV Daily
-Azithromycin 500 mg IV Daily
-Continue home meds: atorvastatin, carvedilol, finasteride, lisinopril, 
oxycodone
-Hold aspirin for thoracentesis, restart post procedure
-Consult with Cardiology for CHF
-Follow up cultures & sensitivites
-Obtain sputum culture
-Trend troponin / EKG until peak or three negative sets
-Transthoracic echocardiogram
-Check lipid panel, HbA1c, TSHR
-Right sided thoracentesis in AM
-Pain control with acetaminophen
-CHF Diet, NPO for possible thoracentesis in AM
-DVT PPx with subcutaneous heparin
-FULL CODE
 
 
Sayda Chung MD 07/23/18 2327:
Core Measures/Misc (9/17)
 
Sepsis (View protocol)
If YES complete Sepsis Event Note If YES complete Sepsis Event Note
 
Attending MD Review Statement
 
Attending Statement
Attending MD Statement: examined this patient, discuss w/resident/PA/NP, agreed 
w/resident/PA/NP, reviewed EMR data (avail)
Attending Assessment/Plan:
53M PMH CAD w/ MI X2 s/p PCI with JUDITH x4, HFrEF (LVEF 25-30%), T2DM, HLD, HTN, 
guillain barr syndrome presenting with bilateral lower extremity edema and 
shortness of breath progressively worsening for the past few days.  No fever, 
cough, hemoptysis (has a history), or chest pain.  EKG NSR, potassium 3.0, 
normal renal function.  CXR shows pulmonary vascular congestion, CTA chest shows
right sided effusion with RLL and RML infiltrate, consistent with prior CTs.  
Had a thoracentesis a year ago for similar.  
 
1. Acute on chronic systolic CHF
2. Right recurrent pleural effusion
3. Hypokalemia
 
Will admit to telemetry, IV diuresis, pulmonary and cardiology consults, replete
potassium, hold antibiotics for now, sputum culture, continue home medications, 
DVT PPx.

## 2018-07-23 NOTE — ADMISSION CERTIFICATION
Admission Certification
 
Certification Statement
- As attending physician, I certify that at the time of
- admission, based on clinical presentation, severity of
- symptoms, need for further diagnostic testing and
- therapeutic interventions, and risk of adverse outcomes
- without in-hospital treatment, in my clinical assessment,
- this patient requires an acute hospital stay for a minimum
- of two nights or longer. I have also considered psychsocial
- factors such as support system, advanced age, financial
- issues, cognitive issues, and failed out-patient treatments,
- past re-admission history, safety of patient, and lack of
- compliance as applicable.
Specific rationale supporting this admission is:
Acute CHF with moderate pleural effusion

## 2018-07-23 NOTE — RADIOLOGY REPORT
EXAMINATION:
XR CHEST
 
CLINICAL INFORMATION:
Shortness of breath
 
COMPARISON:
12/01/2017 and 06/15/2018
 
TECHNIQUE:
2 views of the chest were obtained.
 
FINDINGS:
Mild cardiomegaly; also, there is generalized prominence of the pulmonary
vasculature without overt edema. Small right pleural effusion, similar
compared to 06/15/2018, associated with compressive atelectasis in the right
lower lobe and middle lobe. The visualized bones are intact.
 
IMPRESSION:
- Cardiomegaly and pulmonary vascular congestion without overt interstitial
edema.
 
- Small right pleural effusion and compressive atelectasis in right lung base
remain similar in appearance compared to 06/15/2018.

## 2018-07-23 NOTE — CT SCAN REPORT
EXAMINATION:
CT ANGIOGRAM CHEST WITH AND WITHOUT CONTRAST (CT PULMONARY ANGIOGRAM FOR PE)
 
CLINICAL INFORMATION:
Shortness of breath. Elevated D-dimer.
 
COMPARISON:
04/24/2017, 11/25/2016
 
TECHNIQUE:
Prior to contrast administration, noncontrast localization images were
obtained. Subsequently, multidetector volumetric imaging was performed from
the thoracic inlet to below the diaphragms following the administration of 95
mL Optiray 320 intravenous contrast.
No contrast reaction reported.
Sagittal, coronal, and MIP oblique sagittal reformatted images were obtained
on the CT workstation, uploaded to PACS, and reviewed.
 
Total exam dose-length product 575.22 mGy-cm.
 
FINDINGS:
 
QUALITY OF STUDY/CONTRAST BOLUS: Satisfactory
 
PULMONARY ARTERIES: Limited evaluation of the pulmonary arteries within the
right middle lobe and right lower lobe due to consolidation of the lung. No
definite pulmonary emboli are identified.
 
THORACIC AORTA: No aneurysm or dissection.
 
LUNG: Moderate right pleural effusion. There is near complete consolidation
of the right middle lobe with air bronchograms. The left lower lung is nearly
completely consolidated, also with small air bronchograms. The consolidation
is patchy. Mosaic attenuation of the aerated portions of lung bilaterally
with septal thickening.
 
A few scattered calcified granulomas. Many of the previously described
noncalcified pulmonary nodules aren't able to be characterized on the current
exam due to the consolidations. There is an increasing area of nodularity in
the subpleural left upper lobe anteriorly now measuring 0.6 cm, previously
0.3 cm. Stable 0.4 cm pulmonary nodule posterolateral left lower lobe (series
3 image [id number]).
 
PLEURA: Moderate right pleural effusion. No left pleural effusion. No
pneumothorax bilaterally.
 
MEDIASTINUM: Cardiomegaly. No pericardial effusion. Enlarged mediastinal
lymph nodes, for example there is a pretracheal node measuring 1.3 x 1.5 cm.
A second pretracheal node measures 1.1 x 2.5 cm. These findings are
unchanged. Extensive coronary artery calcifications with likely stent
placements. Unchanged calcification within the left ventricle, possibly on a
chordae tendineae. This is of uncertain clinical significance. The trachea is
midline and central airways are patent. Limited views of the thyroid gland
are unremarkable. No evidence of septal bowing or right heart strain.
 
CHEST WALL/AXILLA: No axillary or internal mammary lymphadenopathy.
 
OSSEOUS STRUCTURES: No acute or suspicious osseous abnormality.
 
UPPER ABDOMEN: Unremarkable. No reflux of contrast into the hepatic veins to
suggest elevated right heart pressures.
 
IMPRESSION:
1. Consolidations involving the right middle lobe and right lower lobe limit
evaluation for pulmonary embolus in these regions. No pulmonary emboli are
identified outside the lung lobes.
 
2. Moderate right pleural effusion. The consolidations within the right
middle lobe and right lower lobe may be at least partially on the basis of
compression although the patchy nature and air bronchograms suggest there may
been an underlying infectious process. This appears very similar when
compared with the prior study from 11/25/2016.
 
3. Stable mediastinal lymphadenopathy.
 
4. Previously described pulmonary nodules are poorly evaluated due to the
underlying lung parenchymal disease on the current study. There is apparent
increasing size of a left anterior upper lobe nodule. Recommend dedicated
chest CT once the patient is recovered from this acute episode to assess the
previously described pulmonary nodules.
 
5. Unchanged calcification within the left ventricle, of uncertain clinical
significance or etiology.
 
VTE: negative.

## 2018-07-24 VITALS — SYSTOLIC BLOOD PRESSURE: 108 MMHG | DIASTOLIC BLOOD PRESSURE: 80 MMHG

## 2018-07-24 VITALS — SYSTOLIC BLOOD PRESSURE: 112 MMHG | DIASTOLIC BLOOD PRESSURE: 70 MMHG

## 2018-07-24 VITALS — SYSTOLIC BLOOD PRESSURE: 136 MMHG | DIASTOLIC BLOOD PRESSURE: 88 MMHG

## 2018-07-24 LAB
ABSOLUTE GRANULOCYTE CT: 7 /CUMM (ref 1.4–6.5)
BASOPHILS # BLD: 0 /CUMM (ref 0–0.2)
BASOPHILS NFR BLD: 0.1 % (ref 0–2)
EOSINOPHIL # BLD: 0.3 /CUMM (ref 0–0.7)
EOSINOPHIL NFR BLD: 3.2 % (ref 0–5)
ERYTHROCYTE [DISTWIDTH] IN BLOOD BY AUTOMATED COUNT: 19 % (ref 11.5–14.5)
GRANULOCYTES NFR BLD: 81.6 % (ref 42.2–75.2)
HCT VFR BLD CALC: 37.9 % (ref 42–52)
LYMPHOCYTES # BLD: 0.9 /CUMM (ref 1.2–3.4)
MCH RBC QN AUTO: 26 PG (ref 27–31)
MCHC RBC AUTO-ENTMCNC: 32.1 G/DL (ref 33–37)
MCV RBC AUTO: 81.1 FL (ref 80–94)
MONOCYTES # BLD: 0.4 /CUMM (ref 0.1–0.6)
PLATELET # BLD: 203 /CUMM (ref 130–400)
PMV BLD AUTO: 8.2 FL (ref 7.4–10.4)
RED BLOOD CELL CT: 4.68 /CUMM (ref 4.7–6.1)
WBC # BLD AUTO: 8.6 /CUMM (ref 4.8–10.8)

## 2018-07-24 PROCEDURE — 0W993ZZ DRAINAGE OF RIGHT PLEURAL CAVITY, PERCUTANEOUS APPROACH: ICD-10-PCS

## 2018-07-24 NOTE — PN- HOUSESTAFF
Isabel Vicente 18 0722:
Subjective
Follow-up For:
Acute on Chronic CHF-NYHA Class IV, Rt sided pleural effusion, CAP
Complaints: SOB on ambulating
Subjective:
Pt examined sitting in bed. States that SOB is better. But still complains of a 
chest tightness (4/10)that is ongoing for the past 5 days, better than at time 
of presentation. No acut events. 
 
Review of Systems
Constitutional:
Reports: see HPI. 
 
Objective
Last 24 Hrs of Vital Signs/I&O
 Vital Signs
 
 
Date Time Temp Pulse Resp B/P B/P Pulse O2 O2 Flow FiO2
 
     Mean Ox Delivery Rate 
 
2227 98.1 84 20 112/70  100   
 
 2219  88  108/80     
 
 2201       Nasal 3.0L 
 
       Cannula  
 
 2108       Nasal 3.0L 
 
       Cannula  
 
 1643      96 Nasal 2.0L 
 
       Cannula  
 
 1615 97.5 84 18 136/88  96 Nasal 2.0L 
 
       Cannula  
 
 1052 97.7 76 18 139/81     
 
 1052 97.7 76 18 139/81     
 
 1040   18   94 Nasal 2.0L 
 
       Cannula  
 
 1000   18   95 Room Air  
 
 0800      95 Nasal 2.0L 
 
       Cannula  
 
 0711 97.7 76 18 139/81  100 Nasal 3.0L 
 
       Cannula  
 
 0606 98.3 76 18 136/79  99 Nasal 2.0L 
 
       Cannula  
 
 0418      99 Nasal 2.0L 
 
       Cannula  
 
 
 Intake & Output
 
 
  0800  0000  1600
 
Intake Total   
 
Output Total   1460
 
Balance   -1460
 
    
 
Output, Other   1160
 
Output, Urine   300
 
Patient  192 lb 
 
Weight   
 
 
 
 
Physical Exam
General Appearance: Alert, Oriented X3, Cooperative, No Acute Distress
Skin: No Rashes, No Breakdown, No Significant Lesion
Skin Temp/Moisture Exam: Cool/Dry
HEENT: Atraumatic
Neck: Supple
Cardiovascular: Regular Rate, Normal S1, Normal S2, No Murmurs
Lungs: b/l decreased breath sounds, B/l mild crackles
Abdomen: Normal Bowel Sounds, Soft, Rt hypochondrial tenderness and mass on 
palapation
Neurological: Normal Gait, Normal Speech, Strength at 5/5 X4 Ext, Normal Tone
Extremities: No Clubbing, No Cyanosis, b/l l/l edema 
 
Assessment/Plan
Assessment:
53-year-old man with PMHx of CAD/MI/PCI with 8 stent placements, heart failure 
with low ejection fraction, HTN, HLD, NIDDM, who presented to the ED due to 
worsening of shortness of breath and lower extremity edema X 3days.
Vitals: stable. He is isating well on RA(off O2 now)
Problem list:
#Acute on chronic heart failure with low ejection fraction of 30-35% (NYHA Class
IV)- Induced by possible pneumonia with right-sided pleural effusion
#RML and RLL consolidation, Rt pleural effusion on imaging
#lower extremity pitting edema
#hypokalemia
#Multiple episodes of CAD with stent placement
#Limited access to medical care due to social security problems/unemployed
#Hypertension
#Hyperlipidemia
#Diabetes mellitus
 
Plan:
#RML and RLL consolidation, Rt pleural effusion on imaging: 
-Pt will undergo diagnostic and therapeutic thoracentesis today: 1.1 L fluid was
obtained 
- follow up results of Gram stain and Cx
-Lauro ceftriaxone and azithromycin empirically
 
#Acute on chronic HRrEF:
-Lasix 40 mg IV BID
-Daily weights, I/Os
 
#Hypokalemia:
-now resolved
-keep a check on K and Mag
-replete if necessary
 
#HTN:
-Lauro. Lisinopril 20 mg, and lasix
 
#DM:
-accuchecks and sliding scale insulin
 
Diet:Carb consistent
DVT PX: ALPS, S/c heparin
Code: full code
Problem List:
 1. Hypertension
 
 2. Hyperlipidemia
 
 3. Acute exacerbation of CHF (congestive heart failure)
 
 4. Pulmonary nodule
 
 5. Diabetes mellitus
 
 6. Lung consolidation
 
 7. Pleural effusion
 
Pain Ratin
Pain Location:
substernal chest tightness/pain
Pain Goal: Remain pain free
Pain Plan:
follow pain pathway
Tomorrow's Labs & Rationales:
cbc, bep, mag
 
 
Glenys MORAN,[facility] 15[facility]:
Attending MD Review Statement
 
Attending Statement
Attending MD Statement: examined this patient, discuss w/resident/PA/NP, agreed 
w/resident/PA/NP, reviewed EMR data (avail), discussed with nursing, amended to 
note
Attending Assessment/Plan:
Patient seen and examined.  Reports feeling better following diuresis overnight 
in the emergency room.  Is no longer requiring oxygen supplementation.  Denies 
chest pain.  Denies palpitations.  Continues to report leg swelling.  On 
examination he has no jugular venous distention.  Heart sounds are regular with 
no audible murmur.  Diminished breath sounds in lung bases bilaterally.  Abdomen
is soft and nontender.  He has 2+ pedal edema bilaterally.
 
 
Problems:
1.  Acute on chronic heart failure with reduced ejection fraction.
2.  Moderate-sized right pleural effusion
3.  Coronary artery disease
4.  Hypokalemia
 
Plan:
-Pleural effusion appears to be secondary to his CHF.  Follow-up Gram stain and 
culture.  Continue antibiotic therapy empirically for now.
-Continue diuresis with Lasix 40 mg IV twice daily.
-2 g sodium diet.  Leg elevation.  Monitor input and output closely.
-He did receive potassium limitation in the ER.  Repeat potassium levels and 
supplement as needed.
-Supplement magnesium 400 mg orally daily.

## 2018-07-24 NOTE — CONS- CARDIOLOGY
General Information and HPI
 
Consulting Request
Date of Consult: 07/24/18
Requested By:
Siobhan Veronica MD
 
Reason for Consult:
Heart failure
History of Present Illness:
The patient is a 53-year-old male with history of CAD, status post myocardial 
infarction 2 with drug-eluting stent placement in 2009 and 2014, HFrEF with 
LVEF 2530%, diabetes mellitus, and hypertension.  He is followed in the office 
by Dr. Stephens.  He presents with increased shortness of breath and lower 
extremity edema which has been worsening over the past few weeks and became 
significantly worse over the past 4 days.  He also notes chest discomfort which 
she describes as a substernal pressure which ranges to a 4 out of 10 in 
severity.  He has a recent productive cough.  He complains of orthopnea.  No 
syncope.  No diaphoresis.  No nausea or vomiting.  No lightheadedness or 
dizziness per
 
Allergies/Medications
Allergies:
Coded Allergies:
Penicillins (PER PT WAS TOLD AS A KID 06/22/16)
tetanus toxoid, adsorbed (ARM WENT STIFF, COULDNT MOVE FOR A WEEK 08/09/16)
 
Home Med List:
Aspirin (Aspirin*) 81 MG TAB.CHEW   1 TAB PO DAILY HEART HEALTH  (Reported)
Atorvastatin Calcium 40 MG TABLET   1 TAB PO DAILY HIGH CHOLESTEROL
Carvedilol 3.125 MG TABLET   1 TAB PO BID HEART
Finasteride (Proscar) 5 MG TABLET   1 TAB PO DAILY Urinary Retention 
Furosemide 40 MG TABLET   1 TAB PO BID WATER RETENTION  (Reported)
Lisinopril 20 MG TABLET   20 MG PO DAILY HEART HEALTH
Metformin HCl (Glucophage) 1,000 MG TABLET   1 TAB PO BID Diabetes
Methylprednisolone 4 MG TAB.DS.PK   1 TAB PO DAILY STEROID  (Reported)
Oxycodone HCl/Acetaminophen (Percocet 5-325 MG Tablet) 5 MG-325 MG TABLET   1 
TAB PO BID PRN pain
 
Current Medications:
 Current Medications
 
 
  Sig/John Start time  Last
 
Medication Dose Route Stop Time Status Admin
 
Acetaminophen 650 MG Q6P PRN 07/23 2300 AC 
 
  PO   
 
Aspirin 81 MG DAILY 07/24 0900 AC 07/24
 
  PO   1052
 
Atorvastatin Calcium 40 MG 1700 07/24 1700 AC 07/24
 
  PO   1905
 
Azithromycin 500 MG DAILY 07/24 0900 AC 07/24
 
Sodium Chloride 250 ML IV   1110
 
Azithromycin 500 MG ONCE ONE 07/23 2100 DC 07/23
 
Sodium Chloride 250 ML IV 07/23 2159  2302
 
Carvedilol 3.125 MG BID 07/23 2248 AC 07/24
 
  PO   1052
 
Ceftriaxone Sodium 1,000 MG DAILY 07/24 0900 AC 07/24
 
  IV   1103
 
Ceftriaxone Sodium 0 .STK-MED ONE 07/23 2233 DC 
 
  .ROUTE   
 
Ceftriaxone Sodium 1,000 MG ONCE ONE 07/23 2100 DC 07/23
 
  IV 07/23 2101  2245
 
Finasteride 5 MG DAILY 07/24 0900 AC 07/24
 
  PO   1052
 
Furosemide 0 .STK-MED ONE 07/23 2309 DC 
 
  IV   
 
Furosemide 40 MG BID 07/23 2300 AC 07/24
 
  IV   1052
 
Heparin Sodium  5,000 UNIT Q8 07/24 0600 AC 
 
(Porcine)  SC   
 
Insulin Aspart 0 TIDAC/HS 07/24 1700 UNVr 
 
  SC   
 
Insulin Aspart 0 TIDAC 07/24 1200 DC 
 
  SC   
 
Insulin Human Regular 0 Q6 07/23 2359 DC 07/24
 
  SC   0614
 
Lidocaine 1 ML .STK-MED ONE 07/24 1250 DC 
 
  ID 07/24 1251  
 
Lisinopril 20 MG DAILY 07/24 0900 AC 07/24
 
  PO   1052
 
Magnesium Oxide 400 MG DAILY 07/25 0900 AC 
 
  PO   
 
Magnesium Oxide 400 MG ONE ONE 07/24 0730 DC 07/24
 
  PO 07/24 0731  1052
 
Oxycodone/ 0 .STK-MED ONE 07/24 1309 DC 
 
Acetaminophen  PO   
 
Oxycodone/ 1 TAB BID PRN 07/23 2300 AC 07/24
 
Acetaminophen  PO   1309
 
Potassium Chloride 0 .STK-MED ONE 07/24 1309 DC 
 
  PO   
 
Potassium Chloride 60 MEQ 1300 07/24 1300 DC 07/24
 
  PO 07/24 1301  1309
 
Potassium Chloride 10 MEQ Q1H 07/24 0730 CAN 
 
  IV 07/24 0831  
 
Potassium Chloride 60 MEQ ONCE ONE 07/24 0730 DC 07/24
 
  PO 07/24 0731  1052
 
Potassium Chloride 0 .STK-MED ONE 07/23 2031 DC 
 
  PO   
 
Potassium Chloride 10 MEQ ONCE ONE 07/23 2015 DC 07/23
 
  IV 07/23 2016 2034
 
Potassium Chloride 20 MEQ ONCE ONE 07/23 2015 DC 07/23
 
  PO 07/23 2016 2034
 
 
 
 
Review of Systems
Review of Systems:
No rash.  No tremor.  No hemoptysis.  Hematemesis.  All other systems were 
reviewed, and were noted to be negative.
 
Past History
 
Travel History
Traveled to [place] past 21 day No
 
Medical History
Neurological: GUILLAIN-BARRE SYNDROME
EENT: NONE
Cardiovascular: CAD, CHF, hypertension, hyperlipidemia, myocardial infarction, 
MI, 2009, 2014 with stents rheumatic fever
Respiratory: pneumonia, "FLUID IN LUNGS" CHF
Gastrointestinal: NONE
Hepatic: NONE
Renal: NONE
Musculoskeletal: NONE
Psychiatric: NONE
Endocrine: diabetes
Blood Disorders: NONE
Cancer(s): NONE
GYN/Reproductive: NONE
 
Surgical History
Surgical History: coronary stents post myocardial infarction 2009 AND 2014
 
Family History
Relations & Conditions If Any:
MOTHER
  FH: breast cancer
Relation not specified for:
  *No pertinent family history
 
 
Psychosocial History
Who Do You Live With? roommates
Services at Home: NONE
Smoking Status: Unknown If Ever Smoked
 
Exam & Diagnostic Data
Vital Signs and I&O
Vital Signs
 
 
Date Time Temp Pulse Resp B/P B/P Pulse O2 O2 Flow FiO2
 
     Mean Ox Delivery Rate 
 
07/24 1643      96 Nasal 2.0L 
 
       Cannula  
 
07/24 1615 97.5 84 18 136/88  96 Nasal 2.0L 
 
       Cannula  
 
07/24 1052 97.7 76 18 139/81     
 
07/24 1052 97.7 76 18 139/81     
 
07/24 1040   18   94 Nasal 2.0L 
 
       Cannula  
 
07/24 1000   18   95 Room Air  
 
07/24 0800      95 Nasal 2.0L 
 
       Cannula  
 
07/24 0711 97.7 76 18 139/81  100 Nasal 3.0L 
 
       Cannula  
 
07/24 0606 98.3 76 18 136/79  99 Nasal 2.0L 
 
       Cannula  
 
07/24 0418      99 Nasal 2.0L 
 
       Cannula  
 
07/23 2309  100 18 163/9  99 Nasal 2.0L 
 
       Cannula  
 
07/23 2139 97.4 95 18 169/97  100 Nasal 2.0L 
 
       Cannula  
 
 
 Intake & Output
 
 
 07/24 1600 07/24 0800 07/24 0000 07/23 1600 07/23 0800 07/23 0000
 
Intake Total      
 
Output Total 1460 1550 1000 600  
 
Balance -1460 -1550 -1000 -600  
 
       
 
Output, Other 1160     
 
Output, Urine 300 1550 1000 600  
 
Patient  190 lb  190 lb  
 
Weight      
 
Weight  Reported by Patient  Reported by Patient  
 
Measurement      
 
Method      
 
 
 
Physical Exam:
Gen: The patient is in no acute distress
HEENT: Normal nose, ears, and oropharynx.  Pupils equal bilaterally.  
Conjunctiva normal.
Neck: Supple with no JVD, no masses, and no thyromegaly
Lungs: Bilateral rales with normal respiratory effort
Heart: RRR, S1, S2, 1/6 systolic murmur.  2+ peripheral edema, 1+ pulses in the 
lower extremities bilaterally
Abdomen:  Soft, nontender, no masses.  No hepatomegaly.  No splenomegaly
Extremities: No clubbing or cyanosis.  Normal muscle strength in the upper and 
lower extremities
Skin: Normal skin turgor with no skin ulcers or lesions noted.
Neuro: Cranial nerves intact.  Sensation intact
Psych: Alert and oriented x 3 with appropriate affect
 
 
 
Labs/Surinder Results:
 Laboratory Tests
 
 
 07/24 07/24 07/24 07/24 07/24
 
 1612 1600 1225 1225 1221
 
Chemistry     
 
  Sodium (137 - 145 mmol/L) 141 Cancelled   
 
  Potassium (3.5 - 5.1 mmol/L) 3.5 Cancelled   
 
  Chloride (98 - 107 mmol/L) 99 Cancelled   
 
  Carbon Dioxide (22 - 30 mmol/L) 30 Cancelled   
 
  Anion Gap (5 - 16) 12 Cancelled   
 
  BUN (9 - 20 mg/dL) 19 Cancelled   
 
  Creatinine (0.7 - 1.2 mg/dL) 0.8 Cancelled   
 
  Estimated GFR (>60 ml/min) > 60    
 
  BUN/Creatinine Ratio (7 - 25 %) 23.8 Cancelled   
 
  Total Bilirubin (0.2 - 1.3 mg/dL) 2.0  H    
 
  Direct Bilirubin (< 0.4 mg/dL) 1.1  H    
 
  AST (17 - 59 U/L) 21    
 
  ALT (21 - 72 U/L) 22    
 
  Alkaline Phosphatase (< 127 U/L) 73    
 
  Total Protein (6.3 - 8.2 g/dL) 6.8    
 
  Albumin (3.5 - 5.0 g/dL) 3.6    
 
Hematology     
 
  Lymphocytes (%)    21 
 
  % Normal PMNs (%)    3 
 
Miscellaneous     
 
  Phlebotomy Draw Site     RT CHEST
 
Other Body Source     
 
  Fluid WBC (0 - 5 /CUMM)   255  H  
 
  Fld Mesothelial Cells (%)   76  
 
  Fld Total RBCs Counted (0 /CUMM)   1700  H  
 
  Fluid Glucose (mg/dL)    114 
 
  Fluid Total Protein (g/dL)    2.2 
 
  Fluid LDH (U/L)    290 
 
  Fluid Amylase (U/L)    < 30 
 
  Pleural pH (PH)     7.47
 
 
 
 
 07/24 07/24 07/24
 
 1007 0607 0029
 
Chemistry   
 
  Sodium (137 - 145 mmol/L)  142 
 
  Potassium (3.5 - 5.1 mmol/L)  2.7 *L 
 
  Chloride (98 - 107 mmol/L)  99 
 
  Carbon Dioxide (22 - 30 mmol/L)  32  H 
 
  Anion Gap (5 - 16)  11 
 
  BUN (9 - 20 mg/dL)  16 
 
  Creatinine (0.7 - 1.2 mg/dL)  0.7 
 
  Estimated GFR (>60 ml/min)  > 60 
 
  BUN/Creatinine Ratio (7 - 25 %)  22.9 
 
  Hemoglobin A1c (4.2 - 5.8 %)  6.5  H 
 
  Magnesium (1.6 - 2.3 mg/dL)  1.5  L 
 
  Lactate Dehydrogenase (313 - 618 U/L)  441 
 
  Troponin I (<0.11 ng/ml)   0.06
 
  Total Protein (6.3 - 8.2 g/dL)  6.1  L 
 
  Triglycerides (<150 mg/dL)  62 
 
  Cholesterol (< 200 MG/DL)  82 
 
  LDL Cholesterol, Calc (65 - 129 mg/dL)  48  L 
 
  HDL Cholesterol (40 - 60 mg/dL)  22  L 
 
  Cholesterol/HDL Ratio (0.00 - 4.88 %)  4 
 
  TSH &T3 &Free T4 Intrp (0.27 - 4.20 uIU/mL)  2.680 
 
Hematology   
 
  CBC w Diff  NO MAN DIFF REQ 
 
  WBC (4.8 - 10.8 /CUMM)  8.6 
 
  RBC (4.70 - 6.10 /CUMM)  4.68  L 
 
  Hgb (14.0 - 18.0 G/DL)  12.2  L 
 
  Hct (42 - 52 %)  37.9  L 
 
  MCV (80.0 - 94.0 FL)  81.1 
 
  MCH (27.0 - 31.0 PG)  26.0  L 
 
  MCHC (33.0 - 37.0 G/DL)  32.1  L 
 
  RDW (11.5 - 14.5 %)  19.0  H 
 
  Plt Count (130 - 400 /CUMM)  203 
 
  MPV (7.4 - 10.4 FL)  8.2 
 
  Gran % (42.2 - 75.2 %)  81.6  H 
 
  Lymphocytes % (20.5 - 51.1 %)  10.7  L 
 
  Monocytes % (1.7 - 9.3 %)  4.4 
 
  Eosinophils % (0 - 5 %)  3.2 
 
  Basophils % (0.0 - 2.0 %)  0.1 
 
  Absolute Granulocytes (1.4 - 6.5 /CUMM)  7.0  H 
 
  Absolute Lymphocytes (1.2 - 3.4 /CUMM)  0.9  L 
 
  Absolute Monocytes (0.10 - 0.60 /CUMM)  0.4 
 
  Absolute Eosinophils (0.0 - 0.7 /CUMM)  0.3 
 
  Absolute Basophils (0.0 - 0.2 /CUMM)  0 
 
Other Body Source   
 
  Fluid Total Protein Cancelled  
 
 
 
 
 07/23 07/23
 
 1939 1600
 
Chemistry  
 
  Sodium (137 - 145 mmol/L)  142
 
  Potassium (3.5 - 5.1 mmol/L)  3.0  L
 
  Chloride (98 - 107 mmol/L)  99
 
  Carbon Dioxide (22 - 30 mmol/L)  30
 
  Anion Gap (5 - 16)  13
 
  BUN (9 - 20 mg/dL)  16
 
  Creatinine (0.7 - 1.2 mg/dL)  0.9
 
  Estimated GFR (>60 ml/min)  > 60
 
  BUN/Creatinine Ratio (7 - 25 %)  17.8
 
  Glucose (65 - 99 mg/dL)  100  H
 
  Calcium (8.4 - 10.2 mg/dL)  9.1
 
  Total Bilirubin (0.2 - 1.3 mg/dL)  2.5  H
 
  AST (17 - 59 U/L)  19
 
  ALT (21 - 72 U/L)  23
 
  Alkaline Phosphatase (< 127 U/L)  85
 
  Troponin I (<0.11 ng/ml) 0.05 0.06
 
  Total Protein (6.3 - 8.2 g/dL)  7.2
 
  Albumin (3.5 - 5.0 g/dL)  4.1
 
  Globulin (1.9 - 4.2 gm/dL)  3.1
 
  Albumin/Globulin Ratio (1.1 - 2.2 %)  1.3
 
Coagulation  
 
  D-Dimer High Sensitivty (0 - 243 ng/ml)  589  H
 
Hematology  
 
  CBC w Diff  NO MAN DIFF REQ
 
  WBC (4.8 - 10.8 /CUMM)  10.7
 
  RBC (4.70 - 6.10 /CUMM)  5.19
 
  Hgb (14.0 - 18.0 G/DL)  13.6  L
 
  Hct (42 - 52 %)  42.3
 
  MCV (80.0 - 94.0 FL)  81.5
 
  MCH (27.0 - 31.0 PG)  26.1  L
 
  MCHC (33.0 - 37.0 G/DL)  32.0  L
 
  RDW (11.5 - 14.5 %)  19.6  H
 
  Plt Count (130 - 400 /CUMM)  272
 
  MPV (7.4 - 10.4 FL)  8.5
 
  Gran % (42.2 - 75.2 %)  84.4  H
 
  Lymphocytes % (20.5 - 51.1 %)  9.3  L
 
  Monocytes % (1.7 - 9.3 %)  4.9
 
  Eosinophils % (0 - 5 %)  1.2
 
  Basophils % (0.0 - 2.0 %)  0.2
 
  Absolute Granulocytes (1.4 - 6.5 /CUMM)  9.0  H
 
  Absolute Lymphocytes (1.2 - 3.4 /CUMM)  1.0  L
 
  Absolute Monocytes (0.10 - 0.60 /CUMM)  0.5
 
  Absolute Eosinophils (0.0 - 0.7 /CUMM)  0.1
 
  Absolute Basophils (0.0 - 0.2 /CUMM)  0
 
 
 
 
Diagnostic Data
EKG Results
EKG tracing is independently reviewed, and reveals normal sinus rhythm at 97, 
left atrial abnormality, intraventricular conduction delay, left ankle 
hypertrophy
CXR Results
Stable cardiomegaly.
 
Right-sided pleural effusion persists but demonstrates interval decrease in
size. There is right basilar atelectasis. No pneumothorax.
 
Other Results
CTA chest:
1. Consolidations involving the right middle lobe and right lower lobe limit
evaluation for pulmonary embolus in these regions. No pulmonary emboli are
identified outside the lung lobes.
 
2. Moderate right pleural effusion. The consolidations within the right
middle lobe and right lower lobe may be at least partially on the basis of
compression although the patchy nature and air bronchograms suggest there may
been an underlying infectious process. This appears very similar when
compared with the prior study from 11/25/2016.
 
3. Stable mediastinal lymphadenopathy.
 
4. Previously described pulmonary nodules are poorly evaluated due to the
underlying lung parenchymal disease on the current study. There is apparent
increasing size of a left anterior upper lobe nodule. Recommend dedicated
chest CT once the patient is recovered from this acute episode to assess the
previously described pulmonary nodules.
 
5. Unchanged calcification within the left ventricle, of uncertain clinical
significance or etiology.
 
Echocardiogram 12/14/16:
 1. Aortic sclerosis is present with no valvular stenosis or  
 insufficiency. 
 2. Mitral leaflet thickening is present with mild mitral  
 insufficiency and left atrial enlargement. 
 3. There is no pericardial fluid present. 
 4. The left ventricular chamber size is normal.  THere is global  
 hypokinesia present with an ejection fraction of approximately 30%.   
 WHen compared to the prior study, the contractility of the  
 interventricular septum and anterior wall have improved. 
 5. Mild enlargement of the right heart chambers is present with mild  
 to moderate tricuspid insufficiency and mild pulmonary hypertension. 
 
Assessment/Plan
Assessment/Plan
53-year-old male with history of CAD status post multiple stents, HFrEF with 
LVEF 2530%, diabetes mellitus, and hypertension presenting with acute on 
chronic exacerbation of systolic heart failure.  A thoracentesis was performed 
in the emergency department with partial improvement in symptoms.
 
Recommendations:
* With increased dose of IV Lasix to 60 mg IV every 12
* Monitor input and output with daily weights
* Check basic metabolic profile daily
* Echocardiogram
* Continue other cardiac medication
 
 
 
Consult Acknowledgment
- Thank you for your consult request.

## 2018-07-24 NOTE — EVENT NOTE
Event Note
Event Note:
Situation:
As informed by the nurse at 22: 20 that Mr. Gan has a V. tach run of 10 beats 
which had ocular at 18: 44.
 
Background:
The patient is a 53-year-old man with significant past medical history for 2 
episodes of MI with a stent placement, heart failure with low ejection fraction,
who presents to the ED due to aggravated shortness of breath and lower extremity
edema. Based on history and physical examination the patient seems to have acute
on chronic heart failure with reduced ejection fraction which might be induced 
by pneumonia and pleural effusion.
 
Assessment:
I reviewed the lab data of the patient he had a potassium: 3.5 in the afternoon,
his magnesium was 1.5 in the morning (he was started on magnesium oxide tab), 
and his calcium was 9.1 yesterday afternoon point
 
Recommendation:
Underlying cause for his V. tach might be low magnesium level, he has been 
started on magnesium oxide, and recheck a magnesium level is in the orders for 
tomorrow morning.

## 2018-07-24 NOTE — RADIOLOGY REPORT
EXAMINATION:\H\
\N\XR CHEST
 
CLINICAL INFORMATION:
Status post right-sided thoracentesis
 
COMPARISON:
CTA chest and chest x-ray 07/23/2018
 
TECHNIQUE:
Frontal view of the chest was obtained.
 
FINDINGS:
Stable cardiomegaly.
 
Right-sided pleural effusion persists but demonstrates interval decrease in
size. There is right basilar atelectasis. No pneumothorax.
 
IMPRESSION:
No pneumothorax status post right-sided thoracentesis.

## 2018-07-24 NOTE — ULTRASOUND REPORT
PROCEDURE:
Thoracentesis
 
CLINICAL INFORMATION:
Right Pleural Effusion
 
COMPARISON:
CTA chest 07/23/2018
 
TECHNIQUE:
Indirect ultrasound guided thoracentesis using a 5 Czech Yueh catheter.
 
FINDINGS:
Informed consent was obtained from the patient prior to the procedure. During
this process, the procedure alternatives were explained, along with the
intended outcome and benefits. The risks of the procedure, as well as the
risk of not doing the procedure, was discussed. The patient was given the
opportunity to ask questions regarding the procedure and appeared competent
to make medical decisions. A signed consent form which documents this
discussion was placed in the medical record. A timeout procedure was
performed.
 
Ultrasound evaluation of the chest for pleural fluid was performed.  A large
pleural effusion is noted on the right side. Using standard interventional
and sterile techniques, lidocaine was used to anesthetize the region.  A 5
Czech Yueh catheter was introduced into the right  pleural fluid using
standard safety needle technique. Approximately 1160 mL of light yellow
fluid was removed into the Vacutainer bottles. The catheter was then removed.
Good hemostasis was achieved.
 
The patient tolerated the procedure well.  The patient was discharged from
the department in stable condition. Patient scheduled for post procedure
followup x-ray.
 
COMPLICATIONS:
None.
 
IMPRESSION:
Successful ultrasound-guided thoracentesis yielding approximately 1.1 L of
fluid.

## 2018-07-25 VITALS — SYSTOLIC BLOOD PRESSURE: 108 MMHG | DIASTOLIC BLOOD PRESSURE: 72 MMHG

## 2018-07-25 VITALS — SYSTOLIC BLOOD PRESSURE: 122 MMHG | DIASTOLIC BLOOD PRESSURE: 78 MMHG

## 2018-07-25 VITALS — SYSTOLIC BLOOD PRESSURE: 118 MMHG | DIASTOLIC BLOOD PRESSURE: 84 MMHG

## 2018-07-25 LAB
ABSOLUTE GRANULOCYTE CT: 6.2 /CUMM (ref 1.4–6.5)
BASOPHILS # BLD: 0 /CUMM (ref 0–0.2)
BASOPHILS NFR BLD: 0.5 % (ref 0–2)
EOSINOPHIL # BLD: 0.3 /CUMM (ref 0–0.7)
EOSINOPHIL NFR BLD: 2.9 % (ref 0–5)
ERYTHROCYTE [DISTWIDTH] IN BLOOD BY AUTOMATED COUNT: 19.4 % (ref 11.5–14.5)
GRANULOCYTES NFR BLD: 71.3 % (ref 42.2–75.2)
HCT VFR BLD CALC: 37.9 % (ref 42–52)
LYMPHOCYTES # BLD: 1.6 /CUMM (ref 1.2–3.4)
MCH RBC QN AUTO: 26 PG (ref 27–31)
MCHC RBC AUTO-ENTMCNC: 31.6 G/DL (ref 33–37)
MCV RBC AUTO: 82.2 FL (ref 80–94)
MONOCYTES # BLD: 0.6 /CUMM (ref 0.1–0.6)
PLATELET # BLD: 217 /CUMM (ref 130–400)
PMV BLD AUTO: 8.8 FL (ref 7.4–10.4)
RED BLOOD CELL CT: 4.62 /CUMM (ref 4.7–6.1)
WBC # BLD AUTO: 8.7 /CUMM (ref 4.8–10.8)

## 2018-07-25 NOTE — PATIENT DISCHARGE INSTRUCTIONS
Discharge Instructions
 
General Discharge Information
You were seen/treated for:
Acute on chronic Congestive heart failure, right sided pleural effusion 
Watch for these problems:
If you have any of these, please visit your nearest emergency department:
Chest pain, shortness of breath, heart racing fast, fever, chills 
 
Diet
Recommended Diet: Diabetic
 
Activity
Activity Self Limited: Yes
 
Acute Coronary Syndrome
 
Inclusion Criteria
At DC or during hospital stay patient has or had the following:
ACS DIAGNOSIS No
 
Discharge Core Measures
Meds if any: Prescribed or Continued at Discharge
Meds if any: NOT Prescribed or Continued at Discharge
 
Congestive Heart Failure
 
Inclusion Criteria
At DC or during hospital stay patient has or had the following:
CHF DIAGNOSIS Yes
 
Discharge Core Measures
Meds if any: Prescribed or Continued at Discharge
Meds if any: NOT Prescribed or Continued at Discharge
 
Cerebrovascular accident
 
Inclusion Criteria
At DC or during hospital stay patient has or had the following:
CVA/TIA Diagnosis No
 
Discharge Core Measures
Meds if any: Prescribed or Continued at Discharge
Meds if any: NOT Prescribed or Continued at Discharge
 
Venous thromboembolism
 
Inclusion Criteria
VTE Diagnosis No
VTE Type NONE
VTE Confirmed by (Test) NONE
 
Discharge Core Measures
- Per Current guidelines, there needs to be overlap
- treatment for the first 5 days of Warfarin therapy.
- If discharged on Warfarin prior to 5 days of
- overlap therapy, the patient will need to be
- assessed for post discharge needs including
- *Post discharge parental anticoagulation
- *Warfarin and/or parental anticoagulation education
- *Follow up date to check INR post discharge
At least 5 days overlap therapy as Inpatient No
Meds if any: Prescribed or Continued at Discharge
Note: Overlap Therapy is Warfarin and Anticoagulant
Meds if any: NOT Prescribed or Continued at Discharge

## 2018-07-25 NOTE — PN- CARDIOLOGY
Subjective
Subjective:
The patient notes that his shortness breath is significantly better but not 
totally resolved.  Chest pain has improved.  He is now willing to consider a 
defibrillator at some point, however he will not have insurance until November.
 
Objective
Vital Signs and I&Os
Vital Signs
 
 
Date Time Temp Pulse Resp B/P B/P Pulse O2 O2 Flow FiO2
 
     Mean Ox Delivery Rate 
 
07/25 1420 97.6 76 18 118/84  97 Room Air  
 
07/25 0846  84  120/62     
 
07/25 0846  84  120/62     
 
07/25 0800       Nasal 3.0L 
 
       Cannula  
 
07/25 0626 98.4 74 18 108/72  100 Nasal  
 
       Cannula  
 
07/24 2227 98.1 84 20 112/70  100   
 
07/24 2219  88  108/80     
 
07/24 2201       Nasal 3.0L 
 
       Cannula  
 
07/24 2108       Nasal 3.0L 
 
       Cannula  
 
07/24 1643      96 Nasal 2.0L 
 
       Cannula  
 
07/24 1615 97.5 84 18 136/88  96 Nasal 2.0L 
 
       Cannula  
 
 
 Intake & Output
 
 
 07/25 1600 07/25 0800 07/25 0000 07/24 1600 07/24 0800 07/24 0000
 
Intake Total 950     
 
Output Total 450 400  1460 1550 1000
 
Balance 500 -400  -1460 -1550 -1000
 
       
 
Intake, Oral 950     
 
Output, Other    1160  
 
Output, Urine 450 400  300 1550 1000
 
Patient   192 lb  190 lb 
 
Weight      
 
Weight     Reported by Patient 
 
Measurement      
 
Method      
 
 
 
Physical Exam:
Gen: The patient is in no acute distress
HEENT: Normal nose, ears, and oropharynx.  Pupils equal bilaterally.  
Conjunctiva normal.
Neck: Supple with no JVD, no masses, and no thyromegaly
Lungs: Bilateral rales with normal respiratory effort
Heart: RRR, S1, S2, 1/6 systolic murmur.  2+ peripheral edema, 1+ pulses in the 
lower extremities bilaterally
Abdomen:  Soft, nontender, no masses.  No hepatomegaly.  No splenomegaly
Extremities: No clubbing or cyanosis.  Normal muscle strength in the upper and 
lower extremities
Skin: Normal skin turgor with no skin ulcers or lesions noted.
Neuro: Cranial nerves intact.  Sensation intact
Psych: Alert and oriented x 3 with appropriate affect
Current Medications:
 Current Medications
 
 
  Sig/John Start time  Last
 
Medication Dose Route Stop Time Status Admin
 
Acetaminophen 650 MG Q6P PRN 07/23 2300 AC 
 
  PO   
 
Aspirin 81 MG DAILY 07/24 0900 AC 07/25
 
  PO   0845
 
Atorvastatin Calcium 40 MG 1700 07/24 1700 AC 07/24
 
  PO   1905
 
Azithromycin 500 MG DAILY 07/24 0900 DC 07/24
 
Sodium Chloride 250 ML IV   1110
 
Carvedilol 3.125 MG BID 07/23 2248 AC 07/25
 
  PO   0846
 
Ceftriaxone Sodium 1,000 MG DAILY 07/24 0900 DC 07/25
 
  IV   0856
 
Finasteride 5 MG DAILY 07/24 0900 AC 07/25
 
  PO   0847
 
Furosemide 60 MG BID 07/25 0900 DC 
 
  IV   
 
Furosemide 60 MG Q12H 07/25 0730 AC 07/25
 
  IV   0851
 
Furosemide 40 MG BID 07/23 2300 DC 07/24
 
  IV   1942
 
Heparin Sodium  5,000 UNIT Q8 07/24 0600 AC 07/25
 
(Porcine)  SC   0557
 
Insulin Aspart 0 TIDAC/HS 07/24 1700 AC 07/25
 
  SC   1256
 
Insulin Aspart 0 TIDAC 07/24 1200 DC 
 
  SC   
 
Lisinopril 20 MG DAILY 07/24 0900 AC 07/25
 
  PO   0846
 
Magnesium Oxide 400 MG DAILY 07/25 0900 AC 07/25
 
  PO   0846
 
Oxycodone/ 1 TAB BID PRN 07/23 2300 AC 07/24
 
Acetaminophen  PO   2201
 
 
 
 
Results
Last 48 Hrs of Labs/Mics:
 Laboratory Tests
 
07/25/18 0612:
Anion Gap 12, Estimated GFR > 60, BUN/Creatinine Ratio 20.0, Magnesium 1.6, CBC 
w Diff NO MAN DIFF REQ, RBC 4.62  L, MCV 82.2, MCH 26.0  L, MCHC 31.6  L, RDW 
19.4  H, MPV 8.8, Gran % 71.3, Lymphocytes % 17.9  L, Monocytes % 7.4, 
Eosinophils % 2.9, Basophils % 0.5, Absolute Granulocytes 6.2, Absolute 
Lymphocytes 1.6, Absolute Monocytes 0.6, Absolute Eosinophils 0.3, Absolute 
Basophils 0
 
07/24/18 1612:
Anion Gap 12, Estimated GFR > 60, BUN/Creatinine Ratio 23.8, Total Bilirubin 2.0
 H, Direct Bilirubin 1.1  H, AST 21, ALT 22, Alkaline Phosphatase 73, Total 
Protein 6.8, Albumin 3.6
 
07/24/18 1600:
Sodium Cancelled, Potassium Cancelled, Chloride Cancelled, Carbon Dioxide 
Cancelled, Anion Gap Cancelled, BUN Cancelled, Creatinine Cancelled, BUN/
Creatinine Ratio Cancelled
 
07/24/18 1225:
Fluid   H, Fld Mesothelial Cells 76, Fld Total RBCs Counted 1700  H
 
07/24/18 1225:
Lymphocytes 21, % Normal PMNs 3, Fluid Glucose 114, Fluid Total Protein 2.2, 
Fluid , Fluid Amylase < 30
 
07/24/18 1221:
Phlebotomy Draw Site RT CHEST, Pleural pH 7.47
 
07/24/18 1007:
Fluid Total Protein Cancelled
 
07/24/18 0607:
Anion Gap 11, Estimated GFR > 60, BUN/Creatinine Ratio 22.9, Hemoglobin A1c 6.5 
H, Magnesium 1.5  L, Lactate Dehydrogenase 441, Total Protein 6.1  L, 
Triglycerides 62, Cholesterol 82, LDL Cholesterol, Calc 48  L, HDL Cholesterol 
22  L, Cholesterol/HDL Ratio 4, TSH &T3 &Free T4 Intrp 2.680, CBC w Diff NO MAN 
DIFF REQ, RBC 4.68  L, MCV 81.1, MCH 26.0  L, MCHC 32.1  L, RDW 19.0  H, MPV 8.2
, Gran % 81.6  H, Lymphocytes % 10.7  L, Monocytes % 4.4, Eosinophils % 3.2, 
Basophils % 0.1, Absolute Granulocytes 7.0  H, Absolute Lymphocytes 0.9  L, 
Absolute Monocytes 0.4, Absolute Eosinophils 0.3, Absolute Basophils 0
 
07/24/18 0029:
Troponin I 0.06
 
07/23/18 1939:
Troponin I 0.05
 
07/23/18 1600:
Anion Gap 13, Estimated GFR > 60, BUN/Creatinine Ratio 17.8, Glucose 100  H, 
Calcium 9.1, Total Bilirubin 2.5  H, AST 19, ALT 23, Alkaline Phosphatase 85, 
Troponin I 0.06, Total Protein 7.2, Albumin 4.1, Globulin 3.1, Albumin/Globulin 
Ratio 1.3, D-Dimer High Sensitivty 589  H, CBC w Diff NO MAN DIFF REQ, RBC 5.19,
MCV 81.5, MCH 26.1  L, MCHC 32.0  L, RDW 19.6  H, MPV 8.5, Gran % 84.4  H, 
Lymphocytes % 9.3  L, Monocytes % 4.9, Eosinophils % 1.2, Basophils % 0.2, 
Absolute Granulocytes 9.0  H, Absolute Lymphocytes 1.0  L, Absolute Monocytes 
0.5, Absolute Eosinophils 0.1, Absolute Basophils 0
 
 
Assessment/Plan
Assessment/Plan
Assessment:
1.  CAD, status post multiple stents
2.  Acute on chronic HFrEF
3.  Hypertension
 
Plan:
* Continue IV Lasix
* Monitor input and output with daily weight
* Check basic metabolic profile daily.
* Supplement potassium and magnesium
* The patient wishes to be evaluated for defibrillator placement once he has 
insurance.
Continue telemetry? Yes

## 2018-07-25 NOTE — ECHOCARDIOGRAM REPORT
SHINE MORA 
 
 Age:    53     :    1965      Gender:     M 
 
 MRN:    [id number] 
 
 Exam Date:     2018  
                20:05 
 
 Exam Location: 1  
 [place] 
 
 Ht (in):     73      Wt (lb):      190     BSA:    2.11 
 
 BP:          139     /     81 
 
 Ordering Physician:        Tesfaye Francois MD 
 
 Referring Physician:       Duglas Welch MD 
 
 Technologist:              [facility] 
 
 Room Number:               180-01 
 
 Indications:       Heart failure 
 
 Rhythm:                 Sinus 
 
 Technical Quality:      Good 
 
 
 FINDINGS 
 Left Ventricle 
 Normal size left ventricle. Mild concentric left ventricular  
 hypertrophy. Left ventricular ejection fraction is estimated at     
 30-35% with global hypokinesis. 
 
 Right Ventricle 
 Mild right ventricular dilatation. 
 
 Right Atrium 
 Mild right atrial dilatation. 
 
 Left Atrium 
 Mild left atrial dilatation. 
 
 Mitral Valve 
 Mitral valve thickened. Mild mitral regurgitation. 
 
 Aortic Valve 
 Diffuse thickening (sclerosis) of the aortic valve cusps without  
 reduced excursion. No aortic stenosis. No aortic regurgitation. 
 
 Tricuspid Valve 
 Tricuspid valve not well visualized, grossly normal. Mild-to- 
 moderate tricuspid regurgitation. Right ventricular systolic  
 pressure estimated to be elevated at  54  mmHg. 
 
 Pulmonic Valve 
 Pulmonic valve not well visualized, grossly normal. 
 
 Pericardium 
 No pericardial effusion. 
 
 Great Vessels 
 Normal size aortic root. 
 
 CONCLUSIONS 
 Normal size left ventricle. 
 Mild concentric left ventricular hypertrophy. 
 Left ventricular ejection fraction is estimated at    30-35% with  
 global hypokinesis. 
 Mild right ventricular dilatation. 
 Mild right atrial dilatation. 
 Mild left atrial dilatation. 
 Mild mitral regurgitation. 
 Mild-to-moderate tricuspid regurgitation. 
 Right ventricular systolic pressure estimated to be elevated at  54   
 mmHg. 
 
 Duglas Welch M.D. 
 (Electronically Signed) 
 Final Date:      2018  
                  22:42 
 
 MEASUREMENTS  (Male / Female) Normal Values 
 
 2D ECHO 
 LV Diastolic Diameter PLAX                          5.5 cm           4.2 - 5.9 
/ 3.9 - 5.3 cm 
 LV Systolic Diameter PLAX                           5.1 cm           2.1 - 4.0 
cm 
 LV Fractional Shortening PLAX                       7.3 %            25 - 46  %
 
 LV Ejection Fraction 2D Teich                       16.0 %            
 IVS Diastolic Thickness                             1.3 cm            
 LVPW Diastolic Thickness                            1.3 cm            
 LV Relative Wall Thickness                          0.5               
 RV Internal Dim ED PLAX                             2.8 cm           1.9 - 3.8 
cm 
 LVOT Diameter                                       1.8 cm            
 Aortic Root Diameter                                2.6 cm            
 LA Systolic Diameter LX                             4.5 cm           3.0 - 4.0 
/ 2.7 - 3.8 cm 
 LV Ejection Fraction MOD BP                         33.3 %           >= 55  % 
 LV Diastolic Length 4C                              7.9 cm           6.9 - 10.3
cm 
 LV Diastolic Area 4C                                29.0 cm          
 LV Diastolic Volume MOD 4C                          87.0 cm          
 LV Ejection Fraction MOD 4C                         29.9 %            
 LV Stroke Volume MOD 4C                             26.0 cm          
 LV Systolic Length 4C                               7.4 cm            
 LV Systolic Area 4C                                 23.3 cm          
 LV Systolic Volume MOD 4C                           61.0 cm          
 LV Ejection Fraction MOD 2C                         40.8 %            
 LV Diastolic Volume 4C AL                           90.5 cm         85 - 139 /
69 - 109 cm 
 LV Systolic Volume 4C AL                            62.2 cm          
 LV Ejection Fraction 4C AL                          31.3 %            
 LV Stroke Volume 4C AL                              28.3 cm          
 LV Ejection Fraction 2C AL                          40.0 %            
 LA Volume                                           69.0 cm         18 - 58 / 
22 - 52 cm 
 Ascending Aorta Diameter                            2.9 cm            
 
 DOPPLER 
 AV Peak Velocity                                    128.0 cm/s        
 AV Peak Gradient                                    6.6 mmHg          
 AV Mean Velocity                                    87.9 cm/s         
 AV Mean Gradient                                    3.0 mmHg          
 AV Velocity Time Integral                           21.9 cm           
 LVOT Peak Velocity                                  109.0 cm/s        
 LVOT Peak Gradient                                  4.8 mmHg          
 LVOT Mean Velocity                                  65.8 cm/s         
 LVOT Mean Gradient                                  2.0 mmHg          
 LVOT Velocity Time Integral                         15.8 cm           
 LVOT Stroke Volume                                  40.2 cm          
 AV Area Cont Eq vti                                 1.8 cm           
 AV Area Cont Eq pk                                  2.2 cm           
 MV Peak Velocity                                    120.0 cm/s        
 MV Peak Gradient                                    5.8 mmHg          
 MV Mean Velocity                                    63.4 cm/s         
 MV Mean Gradient                                    2.0 mmHg          
 Mitral E Point Velocity                             101.0 cm/s        
 Mitral A Point Velocity                             31.6 cm/s         
 Mitral E to A Ratio                                 3.2               
 MV PHT Velocity                                     123.0 cm/s        
 MV Deceleration [place]                               453.0 cm/s       
 MV Pressure Half Time                               81.5 ms           
 MV Area PHT                                         2.7 cm           
 MV Deceleration Time                                100.0 ms          
 TR Peak Velocity                                    330.0 cm/s        
 TR Peak Gradient                                    43.6 mmHg         
 Right Atrial Pressure                               10.0 mmHg         
 Pulmonary Artery Systolic Pressure                  53.6 mmHg         
 Right Ventricular Systolic Pressure                 53.6 mmHg         
 PV Peak Velocity                                    61.3 cm/s         
 PV Peak Gradient                                    1.5 mmHg          
 PV Mean Velocity                                    38.8 cm/s         
 PV Mean Gradient                                    1.0 mmHg          
 PV Velocity Time Integral                           10.4 cm           
 LV E' Lateral Velocity                              9.0 cm/s          
 Mitral E to LV E' Lateral Ratio                     11.2              
 LV E' Septal Velocity                               3.3 cm/s          
 Mitral E to LV E' Septal Ratio                      30.3

## 2018-07-25 NOTE — PN- HOUSESTAFF
Isabel Vicente 18 0714:
Subjective
Follow-up For:
CHF exacerbation, rt sided pleural effusion, hypokalemia.
 
PNA- ruled out 
Complaints: generalized body aches 
Tele-Events Since Last Visit:
NSR. one episode of a 10 beat run of v.tach 
Subjective:
Pt seen and examined lyimg comfortably in bed. Pt reports feeling better than 
yesterday. However he is upset anf agitated because of his insurance issue. 
There was an event of a 10 beat run of V tach overnight.Pt was asymptomatic and 
his vitals were stable. 
 
Review of Systems
Constitutional:
Reports: see HPI. 
 
Objective
Last 24 Hrs of Vital Signs/I&O
 Vital Signs
 
 
Date Time Temp Pulse Resp B/P B/P Pulse O2 O2 Flow FiO2
 
     Mean Ox Delivery Rate 
 
 2143 97.6 83 18 122/78  97   
 
 2138  84  118/80     
 
 1420 97.6 76 18 118/84  97 Room Air  
 
 0846  84  120/62     
 
 0846  84  120/62     
 
 0800       Nasal 3.0L 
 
       Cannula  
 
 0626 98.4 74 18 108/72  100 Nasal  
 
       Cannula  
 
 
 Intake & Output
 
 
  1600  0800  0000
 
Intake Total 950  
 
Output Total 1400 400 
 
Balance -450 -400 
 
    
 
Intake, Oral 950  
 
Output, Urine 1400 400 
 
Patient   192 lb
 
Weight   
 
 
 
 
Physical Exam
General Appearance: Alert, Oriented X3, Cooperative, No Acute Distress
Skin: No Rashes, No Breakdown, No Significant Lesion
Skin Temp/Moisture Exam: Cool/Dry
Sepsis Skin Exam (color): Normal for Ethnicity
HEENT: Atraumatic, Mucous Membr. moist/pink
Neck: Supple
Cardiovascular: Regular Rate, Normal S1, Normal S2, No Murmurs
Lungs: Clear to Auscultation
Abdomen: Normal Bowel Sounds, Soft, tender on palpation-Rt hypochondrium and 
hypogastrium
Neurological: Normal Speech, Strength at 5/5 X4 Ext, Normal Tone
Extremities: No Clubbing, No Cyanosis, mild l/e edema
 
Assessment/Plan
Assessment:
53-year-old man with PMHx of CAD/MI/PCI with 8 stent placements, heart failure 
with low ejection fraction, HTN, HLD, NIDDM, who presented to the ED due to 
worsening of shortness of breath and lower extremity edema X 3days.
Vitals: stable. He is sating well on RA(off O2 now)
 
Problems:
1.  Acute on chronic heart failure with reduced ejection fraction.
2.  Moderate-sized right pleural effusion
3.  Coronary artery disease
4.  Hypokalemia
5. PNA-ruled out: No evidence of PNA on CXR, neg Gram stain and culture
 
Plan:
-Antibiotics are held bcz PNA ruled out
-Pleural fluid ontained on Thoracentesis is exudative. So, we will f/u with gram
stain and Cx.
-f/u with blood cx
-Lauro lasix at 60 mg IV bid
-Daily weights, Is and Os
- Pt's episode of Non sustained V tach might be an indication for an AICD. We 
will discuss with the pt
-supplement K and keep a check on BEP
-accuchecks and sliding scale insulin
 
-Diet:Carb consistent
DVT PX: ALPS, S/c heparin
Code: full code
Problem List:
 1. Pleural effusion
 
 2. HFrEF (heart failure with reduced ejection fraction)
 
Pain Ratin
Pain Location:
none
Pain Goal: Remain pain free
Pain Plan:
n/a
Tomorrow's Labs & Rationales:
cbc, bep
 
 
Glenys MORAN,Siobhan 18 1428:
Attending MD Review Statement
 
Attending Statement
Attending MD Statement: examined this patient, discuss w/resident/PA/NP, agreed 
w/resident/PA/NP, reviewed EMR data (avail), discussed with nursing, discussed 
with case mgmt, amended to note
Attending Assessment/Plan:
Overnight on telemetry at a symptomatic episode of nonsustained ventricular 
tachycardia.  He is afebrile.  He is hemodynamically stable.  He feels much 
better compared to presentation.  Is maintaining negative fluid balance and has 
lost weight overnight.  On examination he has adequate entry bilaterally no 
mildly decreased in the bases.  Lower extremity edema is improving 
significantly.
 
Problems:
1.  Acute on chronic heart failure with reduced ejection fraction.
2.  Moderate-sized right pleural effusion
3.  Coronary artery disease
4.  Hypokalemia
 
Plan:
-No evidence of pneumonia on chest x-ray.  Pleural fluid Gram stain and culture 
negative.  Discontinue antibiotic therapy.
-Pleural fluid appears exudative mobilize criteria.  Recommend following up 
cytology results.
-Continue diuresis.  His Lasix dose has been increased per recommendation of the
cardiology service.
-In view of his nonsustained ventricular tachycardia and the fact that his 
ejection fraction has not improved in 2 years, I did discuss with patient need 
for AICD placement.  Patient was initially reluctant however appears to be 
reconsidering.  We will follow-up with the cardiology service regarding timing 
of placement of an AICD.  Patient seen and examined.

## 2018-07-26 VITALS — SYSTOLIC BLOOD PRESSURE: 120 MMHG | DIASTOLIC BLOOD PRESSURE: 74 MMHG

## 2018-07-26 VITALS — SYSTOLIC BLOOD PRESSURE: 118 MMHG | DIASTOLIC BLOOD PRESSURE: 66 MMHG

## 2018-07-26 VITALS — DIASTOLIC BLOOD PRESSURE: 78 MMHG | SYSTOLIC BLOOD PRESSURE: 120 MMHG

## 2018-07-26 LAB
ABSOLUTE GRANULOCYTE CT: 5.4 /CUMM (ref 1.4–6.5)
BASOPHILS # BLD: 0 /CUMM (ref 0–0.2)
BASOPHILS NFR BLD: 0.5 % (ref 0–2)
EOSINOPHIL # BLD: 0.3 /CUMM (ref 0–0.7)
EOSINOPHIL NFR BLD: 3.6 % (ref 0–5)
ERYTHROCYTE [DISTWIDTH] IN BLOOD BY AUTOMATED COUNT: 19.2 % (ref 11.5–14.5)
GRANULOCYTES NFR BLD: 68.8 % (ref 42.2–75.2)
HCT VFR BLD CALC: 38.5 % (ref 42–52)
LYMPHOCYTES # BLD: 1.5 /CUMM (ref 1.2–3.4)
MCH RBC QN AUTO: 26.1 PG (ref 27–31)
MCHC RBC AUTO-ENTMCNC: 32 G/DL (ref 33–37)
MCV RBC AUTO: 81.4 FL (ref 80–94)
MONOCYTES # BLD: 0.6 /CUMM (ref 0.1–0.6)
PLATELET # BLD: 228 /CUMM (ref 130–400)
PMV BLD AUTO: 9.1 FL (ref 7.4–10.4)
RED BLOOD CELL CT: 4.73 /CUMM (ref 4.7–6.1)
WBC # BLD AUTO: 7.8 /CUMM (ref 4.8–10.8)

## 2018-07-26 NOTE — PN- CARDIOLOGY
Subjective
Subjective:
Shortness of breath is somewhat better.  Chest pain improving.  No palpitations.
 No diaphoresis.  No lightheadedness or dizziness.
 
Objective
Vital Signs and I&Os
Vital Signs
 
 
Date Time Temp Pulse Resp B/P B/P Pulse O2 O2 Flow FiO2
 
     Mean Ox Delivery Rate 
 
07/26 1600       Room Air  
 
07/26 1332 97.1 77 20 120/78  98 Room Air  
 
07/26 0842    130/80     
 
07/26 0841  72  130/80     
 
07/26 0800      98 Nasal 3.0L 
 
       Cannula  
 
07/26 0655 97.8 72 18 118/66  95 Room Air  
 
07/26 0000       Nasal 3.0L 
 
       Cannula  
 
07/25 2143 97.6 83 18 122/78  97   
 
07/25 2138  84  118/80     
 
 
 Intake & Output
 
 
 07/26 1600 07/26 0800 07/26 0000 07/25 1600 07/25 0800 07/25 0000
 
Intake Total 240  250 950  
 
Output Total   400 
 
Balance 240 -400 -350 -450 -400 
 
       
 
Intake, Oral 240  250 950  
 
Output, Urine   400 
 
Patient      192 lb
 
Weight      
 
 
 
Physical Exam:
Gen: The patient is in no acute distress
HEENT: Normal nose, ears, and oropharynx.  Pupils equal bilaterally.  
Conjunctiva normal.
Neck: Supple with no JVD, no masses, and no thyromegaly
Lungs: Bilateral rales with normal respiratory effort
Heart: RRR, S1, S2, 1/6 systolic murmur.  2+ peripheral edema, 1+ pulses in the 
lower extremities bilaterally
Abdomen:  Soft, nontender, no masses.  No hepatomegaly.  No splenomegaly
Extremities: No clubbing or cyanosis.  Normal muscle strength in the upper and 
lower extremities
Skin: Normal skin turgor with no skin ulcers or lesions noted.
Neuro: Cranial nerves intact.  Sensation intact
Current Medications:
 Current Medications
 
 
  Sig/John Start time  Last
 
Medication Dose Route Stop Time Status Admin
 
Acetaminophen 650 MG Q6P PRN 07/23 2300 AC 
 
  PO   
 
Aspirin 81 MG DAILY 07/24 0900 AC 07/26
 
  PO   0842
 
Atorvastatin Calcium 40 MG 1700 07/24 1700 AC 07/26
 
  PO   1854
 
Carvedilol 3.125 MG BID 07/23 2248 AC 07/26
 
  PO   0842
 
Finasteride 5 MG DAILY 07/24 0900 AC 07/26
 
  PO   0841
 
Furosemide 40 MG Q12H 07/26 1930 DC 
 
  IV   
 
Furosemide 40 MG 0730,1630 07/26 1630 AC 07/26
 
  IV   1854
 
Furosemide 40 MG Q12H 07/26 0900 DC 07/26
 
  IV   0847
 
Furosemide 60 MG Q12H 07/25 0730 SC 07/25
 
  IV   2137
 
Heparin Sodium  5,000 UNIT Q8 07/24 0600  07/26
 
(Porcine)  SC   1401
 
Insulin Aspart 0 TIDAC/HS 07/24 1700 AC 07/25
 
  SC   1256
 
Lisinopril 20 MG DAILY 07/24 0900 AC 07/26
 
  PO   0841
 
Magnesium Oxide 400 MG BID 07/26 0900 DC 
 
  PO   
 
Magnesium Oxide 400 MG BID 07/25 2300 AC 07/26
 
  PO   0842
 
Magnesium Oxide 400 MG DAILY 07/25 0900 DC 07/25
 
  PO   0846
 
Oxycodone/ 1 TAB BID PRN 07/23 2300  07/26
 
Acetaminophen  PO   1401
 
Potassium Chloride 40 MEQ DAILY 07/25 2200  07/26
 
  PO   0842
 
 
 
 
Results
Last 48 Hrs of Labs/Mics:
 Laboratory Tests
 
07/26/18 0610:
Anion Gap 11, Estimated GFR > 60, BUN/Creatinine Ratio 27.8  H, Magnesium 1.8, 
CBC w Diff NO MAN DIFF REQ, RBC 4.73, MCV 81.4, MCH 26.1  L, MCHC 32.0  L, RDW 
19.2  H, MPV 9.1, Gran % 68.8, Lymphocytes % 18.8  L, Monocytes % 8.3, 
Eosinophils % 3.6, Basophils % 0.5, Absolute Granulocytes 5.4, Absolute 
Lymphocytes 1.5, Absolute Monocytes 0.6, Absolute Eosinophils 0.3, Absolute 
Basophils 0
 
07/25/18 0612:
Anion Gap 12, Estimated GFR > 60, BUN/Creatinine Ratio 20.0, Magnesium 1.6, CBC 
w Diff NO MAN DIFF REQ, RBC 4.62  L, MCV 82.2, MCH 26.0  L, MCHC 31.6  L, RDW 
19.4  H, MPV 8.8, Gran % 71.3, Lymphocytes % 17.9  L, Monocytes % 7.4, 
Eosinophils % 2.9, Basophils % 0.5, Absolute Granulocytes 6.2, Absolute 
Lymphocytes 1.6, Absolute Monocytes 0.6, Absolute Eosinophils 0.3, Absolute 
Basophils 0
 
 
Assessment/Plan
Assessment/Plan
Assessment:
1.  CAD, status post multiple stents
2.  Acute on chronic HFrEF
3.  Hypertension
 
Plan:
* Continue IV Lasix
* Monitor input and output with daily weight
* Check basic metabolic profile daily.
* Supplement potassium and magnesium
 
Continue telemetry? Yes

## 2018-07-27 VITALS — DIASTOLIC BLOOD PRESSURE: 72 MMHG | SYSTOLIC BLOOD PRESSURE: 130 MMHG

## 2018-07-27 VITALS — SYSTOLIC BLOOD PRESSURE: 126 MMHG | DIASTOLIC BLOOD PRESSURE: 92 MMHG

## 2018-07-27 LAB
ABSOLUTE GRANULOCYTE CT: 5.6 /CUMM (ref 1.4–6.5)
BASOPHILS # BLD: 0 /CUMM (ref 0–0.2)
BASOPHILS NFR BLD: 0.6 % (ref 0–2)
EOSINOPHIL # BLD: 0.2 /CUMM (ref 0–0.7)
EOSINOPHIL NFR BLD: 2.9 % (ref 0–5)
ERYTHROCYTE [DISTWIDTH] IN BLOOD BY AUTOMATED COUNT: 19 % (ref 11.5–14.5)
GRANULOCYTES NFR BLD: 74.1 % (ref 42.2–75.2)
HCT VFR BLD CALC: 38.8 % (ref 42–52)
LYMPHOCYTES # BLD: 1.1 /CUMM (ref 1.2–3.4)
MCH RBC QN AUTO: 25.7 PG (ref 27–31)
MCHC RBC AUTO-ENTMCNC: 31.5 G/DL (ref 33–37)
MCV RBC AUTO: 81.4 FL (ref 80–94)
MONOCYTES # BLD: 0.6 /CUMM (ref 0.1–0.6)
PLATELET # BLD: 196 /CUMM (ref 130–400)
PMV BLD AUTO: 9.1 FL (ref 7.4–10.4)
RED BLOOD CELL CT: 4.77 /CUMM (ref 4.7–6.1)
WBC # BLD AUTO: 7.5 /CUMM (ref 4.8–10.8)

## 2018-07-27 NOTE — DISCHARGE SUMMARY
Hospital Course
Allergies:
Coded Allergies:
Penicillins (PER PT WAS TOLD AS A KID 06/22/16)
tetanus toxoid, adsorbed (ARM WENT STIFF, COULDNT MOVE FOR A WEEK 08/09/16)
 
 
Discharge Instructions
 
Medications at Discharge
Discharge Medications:
Stop taking the following medications:
Oxycodone HCl/Acetaminophen (Percocet 5-325 MG Tablet) 5 MG-325 MG TABLET ORAL 
TWICE DAILY as needed for pain Qty = 10
 
Methylprednisolone (Methylprednisolone) 4 MG TAB.DS.PK ORAL DAILY 
 
Continue taking these medications:
Metformin HCl (Glucophage) 1,000 MG TABLET
    1 Tablet ORAL TWICE DAILY
    Qty = 60
 
Finasteride (Proscar) 5 MG TABLET
    1 Tablet ORAL DAILY
    Qty = 30
    Comments:
       Last Taken: 8/28/17
             Time: 0800 AM
 
Atorvastatin Calcium (Atorvastatin Calcium) 40 MG TABLET
    1 Tablet ORAL DAILY
    Qty = 30
    Comments:
       Last Taken: 8/28/17
             Time: 0800 AM
 
Carvedilol (Carvedilol) 3.125 MG TABLET
    1 Tablet ORAL TWICE DAILY
    Qty = 30
    Comments:
       Last Taken: 8/28/17
             Time: 0800 AM
 
Lisinopril (Lisinopril) 20 MG TABLET
    20 Milligram ORAL DAILY
    Days = 30
    Comments:
       NOT GIVEN DURING THIS ADMISSION
 
Furosemide (Furosemide) 40 MG TABLET
    1 Tablet ORAL TWICE DAILY
 
Aspirin (Aspirin*) 81 MG TAB.CHEW
    1 Tablet ORAL DAILY

## 2018-07-27 NOTE — PN- HOUSESTAFF
Isabel Vicente 18 0723:
Subjective
Follow-up For:
CHF exacerbation, rt sided pleural effusion s/p thoracentesis
Complaints: no complaints
Tele-Events Since Last Visit:
NSR. HR 68-75
Subjective:
Pt seen & examined sitting comfortably in bed in NAD. He had the mild chest 
pressure (chronic) last night. Otherwise no complaints. Say he wants to go home 
today. No acute events overnight.
 
Review of Systems
Constitutional:
Reports: see HPI. 
 
Objective
Last 24 Hrs of Vital Signs/I&O
 Vital Signs
 
 
Date Time Temp Pulse Resp B/P B/P Pulse O2 O2 Flow FiO2
 
     Mean Ox Delivery Rate 
 
 1116   18   95 Room Air Room Air 
 
 
 
 
Physical Exam
General Appearance: Alert, Oriented X3, Cooperative, No Acute Distress
Skin: No Rashes, No Breakdown, No Significant Lesion
Skin Temp/Moisture Exam: Cool/Dry
HEENT: Atraumatic, Mucous Membr. moist/pink
Neck: Supple, No JVD
Cardiovascular: Regular Rate, Normal S1, Normal S2, No Murmurs
Lungs: Clear to Auscultation, Normal Air Movement
Abdomen: Normal Bowel Sounds, mild generalized tenderness
Neurological: Normal Gait, Normal Speech, Strength at 5/5 X4 Ext, Normal Tone
Extremities: No Clubbing, No Cyanosis, b.l l.e pitting edema upto the knees
 
Assessment/Plan
Assessment:
53-year-old man with PMHx of CAD/MI/PCI with 8 stent placements, heart failure 
with low ejection fraction, HTN, HLD, NIDDM, who presented to the ED due to 
worsening of shortness of breath and lower extremity edema X 3days.
Vitals: stable. He is sating well on RA(off O2 now)
 
Problems:
1.  Acute on chronic heart failure with reduced ejection fraction.
2.  Moderate-sized right pleural effusion status post thoracentesis- exudative 
pleural fluid which showed no growth on Cx and gram stain neg
3.  Coronary artery disease
4.  Hypokalemia s/p 8 JUDITH
5. PNA-ruled out: No evidence of PNA on CXR, neg Gram stain and culture
6. HTN
7. Ongoing insurance issues
 
Plan:
* Plan is to d/c today as he is stable
* Switch to Lasix po 60 mg BID
* K 3.3 today. D/c on K supplements
* Patient with follow up with his cardiologist upon discharge for possible AICD 
placement
* Will f.u w/ CHF upon d/c
* will f.u w/ pcp upon d/c
* He is at high risk for re-admission
* Diet:Carb consistent
          DVT PX: ALPS, S/c heparin
          Code: full code
Problem List:
 1. Pleural effusion
 
 2. HFrEF (heart failure with reduced ejection fraction)
 
 3. CAD (coronary artery disease)
 
 4. Hypokalemia
 
Pain Ratin
Pain Location:
none
Pain Goal: Remain pain free
Pain Plan:
follow pain pathway 
Tomorrow's Labs & Rationales:
cbc, bep
 
 
AllieTay ulloa 18 1215:
Attending MD Review Statement
 
Attending Statement
Attending MD Statement: examined this patient, discuss w/resident/PA/NP, agreed 
w/resident/PA/NP, discussed with family, reviewed EMR data (avail), discussed 
with nursing, discussed with case mgmt, reviewed images, amended to note
Attending Assessment/Plan:
No new complaints. Patient admitted here for Acute on chronic heart failure with
reduced ejection fraction with Moderate-sized right pleural effusion. Had IR 
tapped. Pleural fluid appears exudative F/u cytology results. Continue diuresis 
as per cardiology. AICD placement as pe cardiology. Patient can go home on PO 
lasix 60 bid and potassium supplement. He will benefit from CHF clinic 
appointment. He has ongoing insurance issues. He is medically stable for 
dsicharge. He is risk of readmission 2/2 his poor CHF EF 20-25%.

## 2018-07-27 NOTE — PN- CARDIOLOGY
Subjective
Subjective:
  Feeling better.  Shortness of breath improved.  No chest pain.  No 
palpitations.  No diaphoresis.
 
Objective
Vital Signs and I&Os
Vital Signs
 
 
Date Time Temp Pulse Resp B/P B/P Pulse O2 O2 Flow FiO2
 
     Mean Ox Delivery Rate 
 
07/27 1116   18   95 Room Air Room Air 
 
 
 Intake & Output
 
 
 07/28 1600 07/28 0800 07/28 0000 07/27 1600 07/27 0800 07/27 0000
 
Intake Total     120 1000
 
Output Total     650 700
 
Balance     -530 300
 
       
 
Intake, Oral     120 1000
 
Number      1
 
Bowel      
 
Movements      
 
Output, Urine     650 700
 
Patient      194 lb
 
Weight      
 
Weight      Bed scale
 
Measurement      
 
Method      
 
 
 
Physical Exam:
Gen: The patient is in no acute distress
HEENT: Normal nose, ears, and oropharynx.  Pupils equal bilaterally.  
Conjunctiva normal.
Neck: Supple with no JVD, no masses, and no thyromegaly
Lungs: Bilateral rales with normal respiratory effort
Heart: RRR, S1, S2, 1/6 systolic murmur.  2+ peripheral edema, 1+ pulses in the 
lower extremities bilaterally
Abdomen:  Soft, nontender, no masses.  No hepatomegaly.  No splenomegaly
Extremities: No clubbing or cyanosis.  Normal muscle strength in the upper and 
lower extremities
Skin: Normal skin turgor with no skin ulcers or lesions noted.
Neuro: Cranial nerves intact.  Sensation intact
Current Medications:
 Current Medications
 
 
  Sig/John Start time  Last
 
Medication Dose Route Stop Time Status Admin
 
Acetaminophen 650 MG Q6P PRN 07/23 2300 DCD 
 
  PO   
 
Aspirin 81 MG DAILY 07/24 0900 DCD 07/27
 
  PO   0845
 
Atorvastatin Calcium 40 MG 1700 07/24 1700 DCD 07/26
 
  PO   1854
 
Carvedilol 3.125 MG BID 07/23 2248 DCD 07/27
 
  PO   0846
 
Finasteride 5 MG DAILY 07/24 0900 DCD 07/27
 
  PO   0847
 
Furosemide 40 MG 7:30 AM, & 4:30 PM 07/27 0845 DCD 07/27
 
  PO   0848
 
Heparin Sodium  5,000 UNIT Q8 07/24 0600 DCD 07/27
 
(Porcine)  SC   0627
 
Insulin Aspart 0 TIDAC/HS 07/24 1700 DCD 07/25
 
  SC   1256
 
Lisinopril 20 MG DAILY 07/24 0900 DCD 07/27
 
  PO   0847
 
Magnesium Oxide 400 MG BID 07/25 2300 DCD 07/27
 
  PO   0847
 
Oxycodone/ 1 TAB BID PRN 07/23 2300 DCD 07/26
 
Acetaminophen  PO   2038
 
Patient Medication  1 ED ONE ONE 07/27 1430 DC 
 
Teaching  ED 07/27 1431  
 
Potassium Chloride 40 MEQ ONCE ONE 07/27 1400 CAN 
 
  PO 07/27 1401  
 
Potassium Chloride 60 MEQ ONCE ONE 07/27 1015 DC 07/27
 
  PO 07/27 1016  1103
 
Potassium Chloride 40 MEQ DAILY 07/25 2200 DCD 07/27
 
  PO   0848
 
 
 
 
Results
Last 48 Hrs of Labs/Mics:
 Laboratory Tests
 
07/27/18 0650:
Anion Gap 12, Estimated GFR > 60, BUN/Creatinine Ratio 27.8  H, Magnesium 2.0, 
CBC w Diff NO MAN DIFF REQ, RBC 4.77, MCV 81.4, MCH 25.7  L, MCHC 31.5  L, RDW 
19.0  H, MPV 9.1, Gran % 74.1, Lymphocytes % 14.8  L, Monocytes % 7.6, 
Eosinophils % 2.9, Basophils % 0.6, Absolute Granulocytes 5.6, Absolute 
Lymphocytes 1.1  L, Absolute Monocytes 0.6, Absolute Eosinophils 0.2, Absolute 
Basophils 0
 
 
Assessment/Plan
Assessment/Plan
Assessment:
1.  CAD, status post multiple stents
2.  Acute on chronic HFrEF
3.  Hypertension
 
Plan:
*   Change Lasix to 60 milligrams p.o. b.i.d.
*  discharge to home
*  follow-up with Dr. Stephens in with CHF Clinic
 
Continue telemetry? No

## 2018-07-28 NOTE — DISCHARGE SUMMARY
Visit Information
 
Visit Dates
Admission Date:
07/23/18
 
Discharge Date:
07/27/18
 
 
Hospital Course
 
Course
Attending Physician:
Tay Kelley MD
 
Primary Care Physician:
Zeenat Galaviz DO
 
Allergies:
Coded Allergies:
Penicillins (PER PT WAS TOLD AS A KID 06/22/16)
tetanus toxoid, adsorbed (ARM WENT STIFF, COULDNT MOVE FOR A WEEK 08/09/16)
 
 
Discharge Instructions
 
Medications at Discharge
Discharge Medications:
Stop taking the following medications:
Furosemide (Furosemide) 40 MG TABLET ORAL TWICE DAILY 
 
Oxycodone HCl/Acetaminophen (Percocet 5-325 MG Tablet) 5 MG-325 MG TABLET ORAL 
TWICE DAILY as needed for pain Qty = 10
 
Methylprednisolone (Methylprednisolone) 4 MG TAB.DS.PK ORAL DAILY 
 
Continue taking these medications:
Metformin HCl (Glucophage) 1,000 MG TABLET
    1 Tablet ORAL TWICE DAILY
    Qty = 60
    Comments:
       NOT GIVEN
 
Finasteride (Proscar) 5 MG TABLET
    1 Tablet ORAL DAILY
    Qty = 30
    Comments:
       Last Taken: 7/27/18
             Time: 9:00 AM
 
Atorvastatin Calcium (Atorvastatin Calcium) 40 MG TABLET
    1 Tablet ORAL DAILY
    Qty = 30
    Comments:
       Last Taken: 7/26/18
             Time: 5:00 PM
 
Carvedilol (Carvedilol) 3.125 MG TABLET
    1 Tablet ORAL TWICE DAILY
    Qty = 30
    Comments:
       Last Taken: 7/27/18
             Time: 9:00 AM
 
Lisinopril (Lisinopril) 20 MG TABLET
    20 Milligram ORAL DAILY
    Days = 30
    Comments:
       Last Taken: 7/27/18
             Time: 9:00 AM
 
Aspirin (Aspirin*) 81 MG TAB.CHEW
    1 Tablet ORAL DAILY
    Comments:
       Last Taken: 7/27/18
             Time: 9:00 AM
 
Start taking the following new medications:
Potassium Chloride (Klor-Con M20) 20 MEQ TAB.ER.PRT
    40 Millequivalent ORAL DAILY
    Qty = 30
    No Refills
    Instructions:
       .
    Comments:
       Last Taken: 7/27/18
             Time: 9:00 AM
 
Furosemide (Lasix) 20 MG TABLET
    3 Tablet ORAL TWICE DAILY
    Qty = 90
    No Refills
    Instructions:
       .
    Comments:
       Last Taken: 7/27/18
             Time: 9:OO AM

## 2018-08-23 ENCOUNTER — HOSPITAL ENCOUNTER (INPATIENT)
Dept: HOSPITAL 68 - ERH | Age: 53
LOS: 8 days | DRG: 292 | End: 2018-08-31
Attending: INTERNAL MEDICINE | Admitting: INTERNAL MEDICINE
Payer: COMMERCIAL

## 2018-08-23 VITALS — DIASTOLIC BLOOD PRESSURE: 78 MMHG | SYSTOLIC BLOOD PRESSURE: 142 MMHG

## 2018-08-23 VITALS — SYSTOLIC BLOOD PRESSURE: 148 MMHG | DIASTOLIC BLOOD PRESSURE: 70 MMHG

## 2018-08-23 VITALS — BODY MASS INDEX: 24.97 KG/M2 | HEIGHT: 73 IN | WEIGHT: 188.44 LBS

## 2018-08-23 DIAGNOSIS — E87.6: ICD-10-CM

## 2018-08-23 DIAGNOSIS — I25.2: ICD-10-CM

## 2018-08-23 DIAGNOSIS — E11.9: ICD-10-CM

## 2018-08-23 DIAGNOSIS — Z88.0: ICD-10-CM

## 2018-08-23 DIAGNOSIS — Z79.84: ICD-10-CM

## 2018-08-23 DIAGNOSIS — J91.8: ICD-10-CM

## 2018-08-23 DIAGNOSIS — I11.0: Primary | ICD-10-CM

## 2018-08-23 DIAGNOSIS — Z86.69: ICD-10-CM

## 2018-08-23 DIAGNOSIS — E78.5: ICD-10-CM

## 2018-08-23 DIAGNOSIS — I50.23: ICD-10-CM

## 2018-08-23 DIAGNOSIS — I25.10: ICD-10-CM

## 2018-08-23 DIAGNOSIS — I25.5: ICD-10-CM

## 2018-08-23 DIAGNOSIS — Z95.5: ICD-10-CM

## 2018-08-23 LAB
ABSOLUTE GRANULOCYTE CT: 7.6 /CUMM (ref 1.4–6.5)
BASOPHILS # BLD: 0 /CUMM (ref 0–0.2)
BASOPHILS NFR BLD: 0.3 % (ref 0–2)
EOSINOPHIL # BLD: 0.1 /CUMM (ref 0–0.7)
EOSINOPHIL NFR BLD: 0.9 % (ref 0–5)
ERYTHROCYTE [DISTWIDTH] IN BLOOD BY AUTOMATED COUNT: 21 % (ref 11.5–14.5)
GRANULOCYTES NFR BLD: 84.9 % (ref 42.2–75.2)
HCT VFR BLD CALC: 43.7 % (ref 42–52)
LYMPHOCYTES # BLD: 0.8 /CUMM (ref 1.2–3.4)
MCH RBC QN AUTO: 26.1 PG (ref 27–31)
MCHC RBC AUTO-ENTMCNC: 32 G/DL (ref 33–37)
MCV RBC AUTO: 81.6 FL (ref 80–94)
MONOCYTES # BLD: 0.4 /CUMM (ref 0.1–0.6)
PLATELET # BLD: 238 /CUMM (ref 130–400)
PMV BLD AUTO: 8.3 FL (ref 7.4–10.4)
RED BLOOD CELL CT: 5.36 /CUMM (ref 4.7–6.1)
WBC # BLD AUTO: 9 /CUMM (ref 4.8–10.8)

## 2018-08-23 PROCEDURE — 87075 CULTR BACTERIA EXCEPT BLOOD: CPT

## 2018-08-23 NOTE — RADIOLOGY REPORT
EXAMINATION:
XR CHEST
 
CLINICAL INFORMATION:
Shortness of breath.
 
COMPARISON:
Chest x-ray 08/02/2018.
 
TECHNIQUE:
Frontal and lateral views of the chest were obtained.
 
FINDINGS:
The study demonstrates mild interval increase in the right-sided pleural
effusion when compared to the prior study. Adjacent underlying consolidation
cannot be excluded. Mild fluid is now noted in the lateral aspect of the
horizontal fissure. The study redemonstrates the increased interstitial
markings throughout both lungs. The cardiac silhouette is prominent, similar
compared to the prior study. The central pulmonary vasculature is mildly
prominent, but stable compared to the prior study. There are no acute osseous
findings.
 
IMPRESSION:
1. There is been interval increase in the right-sided pleural effusion when
compared to the prior study, and adjacent underlying consolidation cannot be
excluded.
 
2. There is stable cardiomegaly. There is mild prominence of the central
pulmonary vasculature, which appears unchanged.

## 2018-08-23 NOTE — ED GENERAL ADULT
History of Present Illness
 
General
Chief Complaint: General Adult
Stated Complaint: SIB  FOR DIFF BREATHING,ABD PAIN
Source: patient
Exam Limitations: no limitations
 
Vital Signs & Intake/Output
Vital Signs & Intake/Output
 Vital Signs
 
 
Date Time Temp Pulse Resp B/P B/P Pulse O2 O2 Flow FiO2
 
     Mean Ox Delivery Rate 
 
 1555 98.6 108 18 172/97  95   
 
 1545 98.3 105 16 178/99  97 Room Air  
 
 1149       Room Air  
 
 0954 97.4 110 18 165/98  96 Room Air  
 
 
 
Allergies
Coded Allergies:
Penicillins (PER PT WAS TOLD AS A KID 16)
tetanus toxoid, adsorbed (ARM WENT STIFF, COULDNT MOVE FOR A WEEK 16)
 
Reconcile Medications
Aspirin (Aspirin*) 81 MG TAB.CHEW   1 TAB PO DAILY HEART HEALTH  (Reported)
Atorvastatin Calcium 40 MG TABLET   1 TAB PO DAILY HIGH CHOLESTEROL
Carvedilol 3.125 MG TABLET   1 TAB PO BID HEART
Finasteride (Proscar) 5 MG TABLET   1 TAB PO DAILY Urinary Retention 
Furosemide (Lasix) 20 MG TABLET   3 TAB PO BID heart
     .
Lisinopril 20 MG TABLET   20 MG PO DAILY HEART HEALTH
Metformin HCl (Glucophage) 1,000 MG TABLET   1 TAB PO BID Diabetes
Potassium Chloride (Klor-Con M20) 20 MEQ TAB.ER.PRT   40 MEQ PO DAILY low 
potassium
     .
 
Triage Note:
54 Y/O MALE C/O ABDOMINAL PAIN, BILATERAL LEG PAIN
AND SWELLING, AND SOB X 2 DAYS.  PT STATES HE HAS
HX CHF AND THIS FEELS SIMILIAR.  TAKES LAXIS DAILY
BUT DID NOT TAKE TODAYS DOSE (40MG BID).  SWELLING
NOTED TO BILATERAL LOWER EXTREMITIES.  PT REPORTS
N/V AT NIGHT.  EKG COMPLETED, SIGNED BY MD.
SAT 95% RA.  IN W/C FOR COMFORT.
Triage Nurses Notes Reviewed? yes
Onset: Abrupt
Duration: day(s): (2), constant
Timing: recent history
HPI:
This is a 52 y/o M PMHx CHF, DM2, HTN, HLD presenting with SOB, cp, and 
abdominal pain for several days. Pt first noted SOB when he woke up several days
ago. SOB is exacerbated by lying down and exertion. Pt has an associated cough 
that is productive of green phlegm. Pt states that he has had several episodes 
of post-tussive emesis. Pt denies fevers, chills, diaphoresis, henatemesis, 
hemoptysis. Pt noted abdominal pain shotly after SOB. Pain is sharp and constant
8/10 located suprapubically. Pt dewnies radiation of pain to flanks, denies 
dysuria, polyuria, hematuria. Pt states he is havign regular bowel movements. Pt
denies change in diet, N/D/C. PT noted a sterna cp last night. Pain is described
as an 8/10 stabbing that comes and goes for several minutes at a time. Pt denies
radiation of cp, dizziness, lightheadedness. Pt states he was recently admitted 
for a CHF exacerbation this past July in which he had a thoracentesis. 
(Georges Jackson)
 
Past History
 
Travel History
Traveled to [place] past 21 day No
 
Medical History
Any Pertinent Medical History? see below for history
Neurological: GUILLAIN-BARRE SYNDROME
EENT: NONE
Cardiovascular: CAD, CHF, hypertension, hyperlipidemia, myocardial infarction, 
MI, ,  with stents rheumatic fever
Respiratory: pneumonia, "FLUID IN LUNGS" CHF
Gastrointestinal: NONE
Hepatic: NONE
Renal: NONE
Musculoskeletal: NONE
Psychiatric: NONE
Endocrine: diabetes
Blood Disorders: NONE
Cancer(s): NONE
GYN/Reproductive: NONE
History of MRSA: No
History of VRE: No
History of CDIFF: No
 
Surgical History
Surgical History: coronary stents post myocardial infarction  AND 
 
Psychosocial History
Who do you live with Other (see notes)
Services at Home NONE
What is your primary language English
Tobacco Use: Quit >30 days ago
 
Family History
Family History, If Any:
MOTHER
  FH: breast cancer
Relation not specified for:
  *No pertinent family history
 
Hx Contributory? Yes
(Georges Jackson)
 
Review of Systems
 
Review of Systems
Constitutional:
Denies: see HPI. 
EENTM:
Reports: no symptoms. 
Respiratory:
Reports: see HPI. 
Cardiovascular:
Reports: see HPI. 
GI:
Reports: see HPI. 
Genitourinary:
Denies: see HPI. 
Musculoskeletal:
Reports: no symptoms. 
Skin:
Reports: no symptoms. 
Neurological/Psychological:
Reports: no symptoms. 
Hematologic/Endocrine:
Denies: see HPI. 
Immunologic/Allergic:
Reports: no symptoms. 
All Other Systems: Reviewed and Negative
(Georges Jackson)
 
Physical Exam
 
Physical Exam
General Appearance: alert, awake, tachypnic
Head: atraumatic, normal appearance
Eyes:
Bilateral: normal appearance. 
Ears, Nose, Throat: normal ENT inspection
Neck: normal inspection, supple, full range of motion, No JVD
Respiratory: decreased breath sounds, wheezing
Cardiovascular: regular rate/rhythm, tachycardia
Peripheral Pulses:
1+ radial (R), 1+ radial (L), 0 dorsalis pedis (R), 0 dorsalis pedis (L)
Gastrointestinal: normal bowel sounds, distention, Pt is diffusely tender to 
light palpation. Abdomen dull to purcussion
Extremities: bilateral 3+ LE edema
Neurologic/Psych: awake, alert, oriented x 3
 
Core Measures
ACS in differential dx? Yes
CVA/TIA Diagnosis: No
Sepsis Present: No
Sepsis Focused Exam Completed? No
(Ruslan OWENS,Georges)
 
Progress
Differential Diagnoses
I considered the following diagnoses in my evaluation of the patient: 
Acute on chronic CHF exacerbation, AMI, PE, pneumonia, bronchitis, 
diverticulitis, bowel perforation, cholecystitis, pancreatitis.
Plan of Care:
 Orders
 
 
Procedure Date/time Status
 
MAGNESIUM  0600 Active
 
CBC WITHOUT DIFFERENTIAL  0600 Active
 
BASIC ELECTROLYTES PLUS BUN&CR  0600 Active
 
US-GUIDANCE   UNK Active
 
Consistent Carbohydrate 2  L Complete
 
Heart Healthy Diet  D Active
 
URINALYSIS  1733 Active
 
Pathway - chart  1448 Active
 
House Staff  1448 Active
 
Patient Data  1448 Active
 
Code Status  1448 Active
 
ED Holding Orders  1426 Active
 
Admit to inpatient  1426 Active
 
Patient Data  1426 Active
 
Vital Signs  1426 Active
 
Code Status  1426 Complete
 
Intake & Output  1356 Active
 
TROPONIN LEVEL  1142 Complete
 
LIPASE  1142 Complete
 
LACTIC ACID  1142 Complete
 
COMPREHENSIVE METABOLIC PANEL  1142 Complete
 
CBC WITHOUT DIFFERENTIAL  1142 Complete
 
B-TYPE NATRIURETIC PEP (BNP)  1142 Complete
 
AMYLASE  1142 Complete
 
EKG  0948 Active
 
US-GUIDANCE   UNK Active
 
TRC EVALUATION (GEN)   UNK Active
 
Weight   UNK Active
 
VTE Mechanical Prophylaxis   UNK Active
 
Telemetry/Cardiac Monitor   UNK Active
 
Intake & Output   UNK Active
 
FingerStick- Glucose   UNK Active
 
 
 Current Medications
 
 
  Sig/John Start time  Last
 
Medication Dose  Stop Time Status Admin
 
Aspirin 81 MG DAILY 900 AC 
 
(Aspirin)     
 
Atorvastatin Calcium 40 MG DAILY 900 AC 
 
(Lipitor)     
 
Lisinopril 20 MG DAILY 900 AC 
 
(Prinivil)     
 
Potassium Chloride 40 MEQ DAILY 900 AC 
 
(K-Dur)     
 
Carvedilol 3.125 MG BID  2100 AC 
 
(Coreg)     
 
Diclofenac Sodium 1 SALVATORE 4 TIMES/DAY  1737 AC 
 
(Voltaren 1% Gel)     
 
Furosemide 60 MG 7:30 AM, & 4:30 PM  1630 AC 
 
(Lasix)     
 
Finasteride 5 MG DAILY  1559 AC 
 
(Proscar)     
 
Enoxaparin Sodium 40 MG DAILY  1448 AC 
 
(Lovenox)     1531
 
 
 Laboratory Tests
 
 
 
18 1442:
Lactic Acid Cancelled
 
18 1150:
Anion Gap 9, Estimated GFR > 60, BUN/Creatinine Ratio 13.8, Glucose 84, Lactic 
Acid 1.2, Calcium 9.3, Total Bilirubin 2.9  H, AST 22, ALT 21, Alkaline 
Phosphatase 109, Troponin I 0.04, Pro-B-Natriuretic Pept 78087  H, Total Protein
7.7, Albumin 4.1, Globulin 3.6, Albumin/Globulin Ratio 1.1, Amylase 39, Lipase 
21  L, CBC w Diff MAN DIFF ORDERED, RBC 5.36, MCV 81.6, MCH 26.1  L, MCHC 32.0  
L, RDW 21.0  H, MPV 8.3, Gran % 84.9  H, Lymphocytes % 9.3  L, Monocytes % 4.6, 
Eosinophils % 0.9, Basophils % 0.3, Absolute Granulocytes 7.6  H, Absolute 
Lymphocytes 0.8  L, Absolute Monocytes 0.4, Absolute Eosinophils 0.1, Absolute 
Basophils 0, Platelet Estimate ADEQUATE, Anisocytosis 1+
 
Diagnostic Imaging:
Viewed by Me: Radiology Read, CT Scan.  Discussed w/RAD: Radiology Read, CT 
Scan. 
Radiology Impression: PATIENT: SHINE MORA III  MEDICAL RECORD NO: [id number] 
PRESENT AGE: 53  PATIENT ACCOUNT NO: [id number] : 64  LOCATION: [facility] 
ORDERING PHYSICIAN: Georges OWENS     SERVICE DATE:  EXAM TYPE
: RAD - XRY-CHEST XRAY, TWO VIEWS EXAMINATION: XR CHEST CLINICAL INFORMATION: 
Shortness of breath. COMPARISON: Chest x-ray 2018. TECHNIQUE: Frontal and 
lateral views of the chest were obtained. FINDINGS: The study demonstrates mild 
interval increase in the right-sided pleural effusion when compared to the prior
study. Adjacent underlying consolidation cannot be excluded. Mild fluid is now 
noted in the lateral aspect of the horizontal fissure. The study redemonstrates 
the increased interstitial markings throughout both lungs. The cardiac 
silhouette is prominent, similar compared to the prior study. The central 
pulmonary vasculature is mildly prominent, but stable compared to the prior 
study. There are no acute osseous findings. IMPRESSION: 1. There is been 
interval increase in the right-sided pleural effusion when compared to the prior
study, and adjacent underlying consolidation cannot be excluded. 2. There is 
stable cardiomegaly. There is mild prominence of the central pulmonary 
vasculature, which appears unchanged. DICTATED BY: lEiel Cavazos MD  DATE/TIME 
DICTATED:18 :RAD.CAMPBELL  DATE/TIME TRANSCRIBED:1244 CONFIDENTIAL, DO NOT COPY WITHOUT APPROPRIATE AUTHORIZATION.  <
Electronically signed in Other Vendor System>                                   
                                                    SIGNED BY: Eliel Cavazos MD  1251
Initial ED EKG: normal sinus rhythm, rate (111)
Comments:
2018 2:15:40 PM
 
Spoke with Dr. Stephens from cardiology.  Patient will be readmitted for 
diuresis.  IR.
(Georges Jackson)
 
Departure
 
Departure
Disposition: STILL A PATIENT
Condition: Stable
Clinical Impression
Primary Impression: Acute CHF (congestive heart failure)
Referrals:
Zeenat Galaviz DO (PCP/Family)
 
Departure Forms:
Customer Survey
General Discharge Information
(Georges Jackson)
 
Admission Note
Spoke With:
Siobhan Veronica MD
Documentation of Exam:
Documentation of any treatments & extenuating circumstances including Concerns 
Regarding Discharge (functional status, medication knowledge or non-compliance, 
living conditions, etc.) that warrant an admission rather than observation: [
Telemetry monitoring, serial enzymes, IV diuresis, cardiology consultation]
 
 
PA/NP Co-Sign Statement
Statement:
ED Attending supervision documentation-
 
[X] I saw and evaluated the patient. I have also reviewed all the pertinent lab 
results and diagnostic results. I agree with the findings and the plan of care 
as documented in the PA's/NP's documentation. 
 
[X] I have reviewed the ED Record and agree with the PA's/NP's documentation.
 
[] Additions or exceptions (if any) to the PAs/NP's note and plan are 
summarized below:
[See above note]
 
(Davi MORAN,Bossman MOJICA)
 
Critical Care Note
 
Critical Care Note
Critical Care Time: 30-74 min (45)
(Ruslan OWENS,Georges)

## 2018-08-23 NOTE — HISTORY & PHYSICAL
Micheal Zhang 08/23/18 1434:
General Information and HPI
MD Statement:
I have seen and personally examined SHINE MORA III and documented this H&P.
 
The patient is a 53 year old M who presented with a patient stated chief 
complaint of [].
 
Source of Information: patient, family, old records
Exam Limitations: no limitations
History of Present Illness:
Patient is a 53 Male with a PMH of HFrEF 25%, underwent a thoracentesis for  
pleural effusion on July 24th, at [facility] shoulder pain, presented to ER with
a SOB, that started about 1 week ago. He says that the SOB starts when he is 
sleeping, and suddenly wakes him up from sleep, and feels like a panic attack. 
Following which, he has to sit up to releive the symptoms. He also complains of 
fluttering sensation in his chest and chest pain, on and off, stabbing like 
sensation that lasts 3-4 mins. He also has cough, productive with white phlegm. 
Also has abdominal pain and vomitting 3 episdoes , every morning from the past 3
days. Denies fevers.
 
 He says that he sees Dr. Ro for cardiology and takes his Lasix very 
religiously, but hasnt seen him in the past couple of months at outpatient.  He 
hasnt been watching his salt intake. He drinks 1/2- 1 gallon of water everyday 
and hasnt been able to check his weight regularly. Also complains of decreased 
urine output past few days
 
Allergies/Medications
Allergies:
Coded Allergies:
Penicillins (PER PT WAS TOLD AS A KID 06/22/16)
tetanus toxoid, adsorbed (ARM WENT STIFF, COULDNT MOVE FOR A WEEK 08/09/16)
 
Home Med list
Aspirin (Aspirin*) 81 MG TAB.CHEW   1 TAB PO DAILY HEART HEALTH  (Reported)
Atorvastatin Calcium 40 MG TABLET   1 TAB PO DAILY HIGH CHOLESTEROL
Carvedilol 3.125 MG TABLET   1 TAB PO BID HEART
Finasteride (Proscar) 5 MG TABLET   1 TAB PO DAILY Urinary Retention 
Furosemide (Lasix) 20 MG TABLET   3 TAB PO BID heart
     .
Lisinopril 20 MG TABLET   20 MG PO DAILY HEART HEALTH
Metformin HCl (Glucophage) 1,000 MG TABLET   1 TAB PO BID Diabetes
Potassium Chloride (Klor-Con M20) 20 MEQ TAB.ER.PRT   40 MEQ PO DAILY low 
potassium
     .
 
 
Past History
 
Travel History
Traveled to [place] past 21 day No
 
Medical History
Neurological: GUILLAIN-BARRE SYNDROME
EENT: NONE
Cardiovascular: CAD, CHF, hypertension, hyperlipidemia, myocardial infarction, 
MI, 2009, 2014 with stents rheumatic fever
Respiratory: pneumonia, "FLUID IN LUNGS" CHF
Gastrointestinal: NONE
Hepatic: NONE
Renal: NONE
Musculoskeletal: NONE
Psychiatric: NONE
Endocrine: diabetes
Blood Disorders: NONE
Cancer(s): NONE
GYN/Reproductive: NONE
History of MRSA: No
History of VRE: No
History of CDIFF: No
 
Surgical History
Surgical History: coronary stents post myocardial infarction 2009 AND 2014
 
Past Family/Social History
 
Family History
Relations & Conditions if any
MOTHER
  FH: breast cancer
Relation not specified for:
  *No pertinent family history
 
 
Psychosocial History
Who Do You Live With? roommates
Services at Home: NONE
 
Review of Systems
 
Review of Systems
Cardiovascular:
Reports: chest pain, edema, orthopena, palpitations, peripheral edema.  Denies: 
syncope. 
Respiratory:
Reports: cough, orthopnea, short of breath, sputum production.  Denies: 
hemoptysis, stridor, wheezing. 
GI:
Reports: abdominal pain, bloating, distention, vomiting.  Denies: constipation, 
diarrhea, bowel incontinence, melena. 
Genitourinary:
Denies: dysuria, frequency, urgency. 
 
Exam & Diagnostic Data
Last 24 Hrs of Vital Signs/I&O
 Vital Signs
 
 
Date Time Temp Pulse Resp B/P B/P Pulse O2 O2 Flow FiO2
 
     Mean Ox Delivery Rate 
 
08/23 1149       Room Air  
 
08/23 0954 97.4 110 18 165/98  96 Room Air  
 
 
 Intake & Output
 
 
 08/23 1600 08/23 0800 08/23 0000
 
Intake Total 26  
 
Output Total 175  
 
Balance -149  
 
    
 
Intake, IV 26  
 
Output, Urine 175  
 
Patient 194 lb  
 
Weight   
 
Weight Reported by Patient  
 
Measurement   
 
Method   
 
 
 
 
Physical Exam
General Appearance Alert, Oriented X3, Cooperative, No Acute Distress
Cardiovascular Regular Rate, No Murmurs
Lungs expiratory rhonchi heard at the bases
Abdomen Soft, tenderness present in the right upper quadrant
Neurological Normal Gait, Normal Speech, Strength at 5/5 X4 Ext, Normal Tone, 
Sensation Intact
Last 24 Hrs of Labs/Surinder:
 Laboratory Tests
 
08/23/18 1442:
Lactic Acid Cancelled
 
08/23/18 1150:
Anion Gap 9, Estimated GFR > 60, BUN/Creatinine Ratio 13.8, Glucose 84, Lactic 
Acid 1.2, Calcium 9.3, Total Bilirubin 2.9  H, AST 22, ALT 21, Alkaline 
Phosphatase 109, Troponin I 0.04, Pro-B-Natriuretic Pept 56913  H, Total Protein
7.7, Albumin 4.1, Globulin 3.6, Albumin/Globulin Ratio 1.1, Amylase 39, Lipase 
21  L, CBC w Diff MAN DIFF ORDERED, RBC 5.36, MCV 81.6, MCH 26.1  L, MCHC 32.0  
L, RDW 21.0  H, MPV 8.3, Gran % 84.9  H, Lymphocytes % 9.3  L, Monocytes % 4.6, 
Eosinophils % 0.9, Basophils % 0.3, Absolute Granulocytes 7.6  H, Absolute 
Lymphocytes 0.8  L, Absolute Monocytes 0.4, Absolute Eosinophils 0.1, Absolute 
Basophils 0, Platelet Estimate ADEQUATE, Anisocytosis 1+
 
 
Diagnostic Data
EKG Results
NAD
CXR Results
1. There is been interval increase in the right-sided pleural effusion when
compared to the prior study, and adjacent underlying consolidation cannot be
excluded.
 
2. There is stable cardiomegaly. There is mild prominence of the central
pulmonary vasculature, which appears unchanged.
 
 
Assessment/Plan
Assessment:
53 year old male wth a PMH of CHF, Pleural effusion, Left shoulder pain, CAD, 
CHF, hypertension, hyperlipidemia, myocardial infarction, MI, 2009, 2014 with 
stents rheumatic fever, DM pesented to the ER with SOB, orthopnea, Abdominal 
pain, COugh, No significant EKG changes, ELEVATED Bilirubin, Hypokalemia, 
elevated ProBNP- 25529 -
 
1) HFrEF- 25% 
2) Pleural effusion
3) Abdominal pain with elevated Bilirubin
4)Hypokalemia
 
Plan- 
Possible thoracentesis tomorrow
- patient started on lasix, continue aspirin, lipitor, started on Novolog
-Possible Thoracentesis tomorrow 
-admit to telemetry and monitor 
- weight - daily, strict I/O
-will discuss with the team about the abdominal pain and elevated Bilirubin  
about further work up
-supplement Potassium and monitor K
 
 
CODE STATUS-full code
 
DVT prophylaxis - ALPS/heparin
 
As Ranked By This Provider
Problem List:
 1. Abdominal pain
 
 2. CHF (congestive heart failure)
 
 3. Pleural effusion
 
 
Core Measures/Misc (9/17)
 
Cerebrovascular Accident
CVA/TIA Diagnosis: No
 
Sepsis (View protocol)
Sepsis Present: No
If YES complete Sepsis Event Note If YES complete Sepsis Event Note
 
 
Sayda Chung MD 08/23/18 1521:
Core Measures/Misc (9/17)
 
Sepsis (View protocol)
If YES complete Sepsis Event Note If YES complete Sepsis Event Note
 
Attending MD Review Statement
 
Attending Statement
Attending MD Statement: examined this patient, discuss w/resident/PA/NP, agreed 
w/resident/PA/NP, reviewed EMR data (avail)
Attending Assessment/Plan:
53M PMH COPD, CAD s/p MI, CHF (LVEF 25-30%), diabetes mellitus, frequent 
admissions for CHF, here with worsening bilateral LE edema, orthopnea, dyspnea 
on exertion, and abdominal pain.  Found to have anasarca with moderate right 
pleural effusion and cardiac ascites. Juan Pablo chest pain, abdominal pain is RLQ 
and LLQ radiating to umbilicus.  He has been having coughing episodes that lead 
to vomiting the past few days.  He reports being compliant with medications and 
diet.  On exam, he has 4+ pitting edema to both legs up to thighs, no fluid wave
present, right lung absent breath sounds and crackles on the left.  EKG NSR, 
troponin negative.  At home he takes Lasix 60mg PO BID.
 
1. Acute on chronic systolic CHF
2. Anasarca
3. Right pleural effusion
4. Hypokalemia
 
Plan
- Admit to telemetry
- Lasix 40mg IV BID, can increase to 60mg tomorrow if inadequate diuresis
- I/O, daily weights
- Cardiology consult
- Replete potassium
- IR consult for thoracentesis
- Hold ASA tomorrow morning for procedure
- NPO after midnight
- Continue home medications
- DVT PPx
 
Balbina MORAN,Kaushal 08/23/18 1717:
Review of Systems
 
Review of Systems
Constitutional:
Denies: no symptoms. 
 
 
Core Measures/Misc (9/17)
 
Acute Coronary Syndrome
ACS Diagnosis: No
 
Congestive Heart Failure
Congestive Heart Failure Diagnosis Yes
Last Known EF % 25
 
VTE (View Protocol)
VTE Risk Factors Age>40
No Mechanical VTE Prophylaxis d/t N/A MechProphylax Ordered
No VTE Pharm Prophylaxis d/t NA PharmProphylax ordered
 
Sepsis (View protocol)
If YES complete Sepsis Event Note If YES complete Sepsis Event Note
 
Resident Review Statement
Resident Statement: examined this patient, discussed with intern, agreed with 
intern, discussed with family
Other Findings:
Patient is a 53-year-old male presented with chief complaints of shortness of 
breath and abdominal pain since last 1 week.
 
According to the patient he was relatively all right around a week ago.  He was 
compliant with his medication, he is not compliant with his salt intake and the 
fluid intake.  Since last 1 week he is having difficulty in the breathing 
especially when he lies flat.  Because of that he get the panic attacks, 
[place] difficulty in breathing, palpitation, tachycardia.  He was also 
complaining of episodes of cough which led to vomiting since last 3 or 4 days.  
Because of the cough and vomiting he started having pain in his abdomen since 1-
2 days.  The pain is located in the upper part of the abdomen nonradiating 
burning in nature, denies for any radiation, in association with the food.
 
Of note he stopped smoking since February 2018, denies for alcohol use.
 
Past medical history -
CAD w/ MI X2 s/p PCI with drug-eluting stent placement (2009, 2014), most recent
cardiac cath in 11/4/2016 with Dr. Adalberto Esqueda (2 drug-eluting stents to the 
RCA on 2 to the LAD both for 80% stenosis)
HFrEF (LVEF 25-30% on 11/29/16); right-sided pleural effusion(paracentesis 
November 2016)
Type 2 diabetes
Hyperlipidemia
Hypertension
History of Guillain-Dow syndrome
Migraines
 
Vital signs-temperature 97.4, pulse 110, respiratory 18, blood pressure 165/98, 
SPO2 96% on room air.
 
Physical exam-he was conscious, cooperative, pale, dry tongue, cachectic, no JVD
, heart S1-S2 normal, lungs-crackles, wheeze on left lower part of the lung, 
absent air entry right side of the lung, abdomen distended, tender in the upper 
part of the abdomen, bilateral lower extremity pitting edema to the thigh.
 
Blood workup -hemoglobin 14.0, hematocrit 43.7, platelet count 238, granulocyte 
84.9, sodium 141, potassium 3.1, chloride 102, carbon dioxide 31, anion gap 9, 
BUN 11, creatinine 0.8, glucose 84, lactic acid 1.2, calcium 9.3, total 
bilirubin 2.9, AST 22, ALT 21, alkaline phosphatase 109, troponin I 0.04, proBNP
15,600, albumin 4.5, lipase 21, amylase 39.
 
EKG-normal sinus rhythm, LVH, possible left bundle branch block.
 
Assessment and plan-
Large right-sided pleural effusion -
* Discussed with Dr. Castillo we will do two-step paracentesis.  On 8/24/2018 and 
next one will be on Monday.
* TRC/Neb
 
Acute on chronic HFrEF 25% 
* we will admit the patient to telemetry floor
* cardiology consult - Dr ro.
* Injection Lasix 60 mg IV twice daily
* Strict intake output charting
* Daily weight measurement
* Low-salt diet
 
Abdominal pain probably muscular or due to mesenteric edema -
CT scan did not show any evidence of muscle injury.
* Voltaren gel locally
 
Hypokalemia - 3.1
* He was already given potassium 40 mg in the ED.  We will continue his home 
doses of potassium.
 
Type 2 DM -
* Finger Stick TID/HS
* Novalog according to sliding scale
 
CODE STATUS-full code
 
DVT prophylaxis - ALPS/heparin
 
CODE STATUS-full code

## 2018-08-23 NOTE — CT SCAN REPORT
EXAMINATION:
CT ABDOMEN AND PELVIS WITH CONTRAST
 
CLINICAL INFORMATION:
Abdominal pain, across abdomen.
 
COMPARISON:
CT scan of the abdomen and pelvis 08/23/2017.  Chest x-ray also obtained
08/23/2008.
 
TECHNIQUE:
Multidetector volumetric imaging was performed of the abdomen and pelvis
following IV administration of 95 mL of Optiray 320 intravenous contrast.
Sagittal and coronal reformatted images were obtained on the technologist's
workstation.
 
DLP:
464.19 mGy-cm
 
FINDINGS:
 
LUNG BASES: There is a large right-sided pleural effusion comment
demonstrated on recent chest x-ray. There is no pleural effusion on the left.
There is atelectasis versus consolidation in the right lower and middle
lobes. Ground glass opacification is noted in the left lower zone. The
cardiac silhouette is at the upper limits of normal in size. There is no
pericardial effusion. There are coronary artery calcifications.
 
LIVER, GALLBLADDER, AND BILIARY TREE: The liver is slightly small, with a
slightly lobular contour. It has heterogenous, predominantly low density.
There are no focal abnormalities. The intrahepatic ducts are not dilated. The
gallbladder is unremarkable with no evidence of radiopaque gallstones,
gallbladder wall thickening, or obvious pericholecystic inflammatory changes.
 
 
PANCREAS: The pancreas is unremarkable.
 
SPLEEN: The spleen is at the upper limits of normal in size. Heterogenous
density may be due to timing of intravenous contrast bolus.
 
ADRENAL GLANDS: The adrenal glands are not enlarged.
 
KIDNEYS AND URETERS: The kidneys are normal in size, shape, and attenuation.
No hydronephrosis, hydroureter, or calculi seen. No perinephric stranding.
 
BLADDER: The urinary bladder is partially distended.
 
GASTROINTESTINAL TRACT: The stomach is decompressed. The loops of small bowel
are unremarkable. The appendix is not discretely identified. There is
moderate stool within the large bowel. There is no evidence of bowel
obstruction or inflammation.
 
ABDOMINAL WALL: There is diffuse increased density within the subcutaneous
fat, consistent with anasarca.
 
LYMPHOVASCULAR/MESENTERY: There is no abdominal or pelvic lymphadenopathy.
There are atheromatous calcifications of the aorta. No aneurysms are
demonstrated. There is mild perihepatic ascites. There is increased density
of the mesenteric fat, consistent with edema. There is fluid in the right
paracolic gutter, and there is moderate fluid dependently in the pelvis.
 
PELVIC VISCERA: The prostate gland is at the upper limits of normal in size.
 
OSSEOUS STRUCTURES: There are mild multilevel spondylitic changes.
 
IMPRESSION:
1. There is a large right pleural effusion. There is ascites around the
liver, along the right paracolic gutter and in the pelvis. There is anasarca,
and edematous changes are seen in the mesenteric fat.
 
2. The liver is relatively small and has a mildly lobular contour. Density is
heterogenous but appears diffusely low.
 
3. The stomach and bowel are decompressed and there is no evidence of bowel
obstruction.

## 2018-08-24 VITALS — SYSTOLIC BLOOD PRESSURE: 142 MMHG | DIASTOLIC BLOOD PRESSURE: 76 MMHG

## 2018-08-24 VITALS — SYSTOLIC BLOOD PRESSURE: 120 MMHG | DIASTOLIC BLOOD PRESSURE: 68 MMHG

## 2018-08-24 VITALS — DIASTOLIC BLOOD PRESSURE: 82 MMHG | SYSTOLIC BLOOD PRESSURE: 120 MMHG

## 2018-08-24 LAB
ABSOLUTE GRANULOCYTE CT: 5.5 /CUMM (ref 1.4–6.5)
BASOPHILS # BLD: 0 /CUMM (ref 0–0.2)
BASOPHILS NFR BLD: 0.5 % (ref 0–2)
EOSINOPHIL # BLD: 0.2 /CUMM (ref 0–0.7)
EOSINOPHIL NFR BLD: 2.3 % (ref 0–5)
ERYTHROCYTE [DISTWIDTH] IN BLOOD BY AUTOMATED COUNT: 21.5 % (ref 11.5–14.5)
GRANULOCYTES NFR BLD: 76.2 % (ref 42.2–75.2)
HCT VFR BLD CALC: 39.1 % (ref 42–52)
LYMPHOCYTES # BLD: 1.1 /CUMM (ref 1.2–3.4)
MCH RBC QN AUTO: 26.3 PG (ref 27–31)
MCHC RBC AUTO-ENTMCNC: 32.3 G/DL (ref 33–37)
MCV RBC AUTO: 81.5 FL (ref 80–94)
MONOCYTES # BLD: 0.4 /CUMM (ref 0.1–0.6)
PLATELET # BLD: 207 /CUMM (ref 130–400)
PMV BLD AUTO: 8.6 FL (ref 7.4–10.4)
RED BLOOD CELL CT: 4.8 /CUMM (ref 4.7–6.1)
WBC # BLD AUTO: 7.2 /CUMM (ref 4.8–10.8)

## 2018-08-24 PROCEDURE — 0W993ZZ DRAINAGE OF RIGHT PLEURAL CAVITY, PERCUTANEOUS APPROACH: ICD-10-PCS

## 2018-08-24 NOTE — PATIENT DISCHARGE INSTRUCTIONS
Discharge Instructions
 
General Discharge Information
You were seen/treated for:
Heart Failure w. reduced Ejection Fraction
 
Pleural effusion
You had these procedures:
Thoracentesis
Watch for these problems:
If you experience any worsening leg swelling, lightheadedness, dizziness, chest 
pain, palpitations, shortness of breath, and fatigue please follow up with your 
PCP.
Special Instructions:
Please follow up with your PCP within one week.
Please follow up with your cardiologist within one week.
Please continue to take your home medications.
Please follow up with your weekly CHF clinic.
 
Diet
Continue normal diet: Yes
Recommended Diet: Diabetic
 
Activity
Full Activity/No Limits: No
Activity Self Limited: Yes
 
Acute Coronary Syndrome
 
Inclusion Criteria
At DC or during hospital stay patient has or had the following:
 
Discharge Core Measures
Meds if any: Prescribed or Continued at Discharge
Meds if any: NOT Prescribed or Continued at Discharge
 
Congestive Heart Failure
 
Inclusion Criteria
At DC or during hospital stay patient has or had the following:
 
Discharge Core Measures
Meds if any: Prescribed or Continued at Discharge
Meds if any: NOT Prescribed or Continued at Discharge
 
Cerebrovascular accident
 
Inclusion Criteria
At DC or during hospital stay patient has or had the following:
CVA/TIA Diagnosis No
 
Discharge Core Measures
Meds if any: Prescribed or Continued at Discharge
Meds if any: NOT Prescribed or Continued at Discharge
 
Venous thromboembolism
 
Discharge Core Measures
- Per Current guidelines, there needs to be overlap
- treatment for the first 5 days of Warfarin therapy.
- If discharged on Warfarin prior to 5 days of
- overlap therapy, the patient will need to be
- assessed for post discharge needs including
- *Post discharge parental anticoagulation
- *Warfarin and/or parental anticoagulation education
- *Follow up date to check INR post discharge
Meds if any: Prescribed or Continued at Discharge
Note: Overlap Therapy is Warfarin and Anticoagulant
Meds if any: NOT Prescribed or Continued at Discharge

## 2018-08-24 NOTE — ULTRASOUND REPORT
PROCEDURE:
Thoracentesis
 
CLINICAL INFORMATION:
Pleural Effusion
 
COMPARISON:
CT abdomen pelvis 08/23/2018
 
TECHNIQUE:
Indirect ultrasound guided thoracentesis using a 5 [place] Yueh catheter.
 
FINDINGS:
Informed consent was obtained from the patient prior to the procedure. During
this process, the procedure alternatives were explained, along with the
intended outcome and benefits. The risks of the procedure, as well as the
risk of not doing the procedure, was discussed. The patient was given the
opportunity to ask questions regarding the procedure and appeared competent
to make medical decisions. A signed consent form which documents this
discussion was placed in the medical record. A timeout procedure was
performed.
 
Ultrasound evaluation of the chest for pleural fluid was performed.  A
moderate to large pleural effusion is noted on the right side. Using standard
interventional and sterile techniques, lidocaine was used to anesthetize the
region.  A 5 [place] Yueh catheter was introduced into the right  pleural
fluid using standard safety needle technique. Approximately 1560 mL of light
yellow  fluid was removed into the Vacutainer bottles. The catheter was then
removed. Good hemostasis was achieved.
 
The patient tolerated the procedure well. A sterile dressing was placed. The
patient was discharged from the department in stable condition. Postprocedure
chest x-ray demonstrated no pneumothorax.
 
COMPLICATIONS:
None.
 
IMPRESSION:
Successful ultrasound-guided thoracentesis of 1.5 L of fluid.

## 2018-08-24 NOTE — CONS- CARDIOLOGY
General Information and HPI
 
Consulting Request
Date of Consult: 08/24/18
Requested By:
Sayda Chung MD
 
Reason for Consult:
Worsening right pleural effusion with shortness of breath
Source of Information: patient, old records
Exam Limitations: no limitations
History of Present Illness:
Mr. Gan is a 53-year-old male who is well-known to me.  He has a history of 
coronary artery disease with a previously very low ejection fraction.  The 
patient had to drug-eluting stents placed in 2016 with subsequent improvement of
his ejection fraction to 30-35%.  He had a thoracentesis at [facility] in 
July.  He is now back for recurrent shortness of breath, atypical chest pain, 
and noted to have a very large right pleural effusion again.  According to the 
patient, he has been faithful with his medications including his diuretic at 
home.  No other new issues noted at the present time.
 
 
 
Allergies/Medications
Allergies:
Coded Allergies:
Penicillins (PER PT WAS TOLD AS A KID 06/22/16)
tetanus toxoid, adsorbed (ARM WENT STIFF, COULDNT MOVE FOR A WEEK 08/09/16)
 
Home Med List:
Aspirin (Aspirin*) 81 MG TAB.CHEW   1 TAB PO DAILY HEART HEALTH  (Reported)
Atorvastatin Calcium 40 MG TABLET   1 TAB PO DAILY HIGH CHOLESTEROL
Carvedilol 3.125 MG TABLET   1 TAB PO BID HEART
Finasteride (Proscar) 5 MG TABLET   1 TAB PO DAILY Urinary Retention 
Furosemide (Lasix) 20 MG TABLET   3 TAB PO BID heart
     .
Lisinopril 20 MG TABLET   20 MG PO DAILY HEART HEALTH
Metformin HCl (Glucophage) 1,000 MG TABLET   1 TAB PO BID Diabetes
Potassium Chloride (Klor-Con M20) 20 MEQ TAB.ER.PRT   40 MEQ PO DAILY low 
potassium
     .
 
 
Past History
 
Travel History
Traveled to [place] past 21 day No
 
Medical History
Neurological: GUILLAIN-BARRE SYNDROME
EENT: NONE
Cardiovascular: CAD, CHF, hypertension, hyperlipidemia, myocardial infarction, 
MI, 2009, 2014 with stents rheumatic fever
Respiratory: pneumonia, "FLUID IN LUNGS" CHF
Gastrointestinal: NONE
Hepatic: NONE
Renal: NONE
Musculoskeletal: NONE
Psychiatric: NONE
Endocrine: diabetes
Blood Disorders: NONE
Cancer(s): NONE
GYN/Reproductive: NONE
 
Surgical History
Surgical History: coronary stents post myocardial infarction 2009 AND 2014
 
Family History
Relations & Conditions If Any:
MOTHER
  FH: breast cancer
Relation not specified for:
  *No pertinent family history
 
 
Psychosocial History
Where Do You Live? Home
Who Do You Live With? roommates
Services at Home: NONE
Smoking Status: Former Smoker
 
Exam & Diagnostic Data
Vital Signs and I&O
Vital Signs
 
 
Date Time Temp Pulse Resp B/P B/P Pulse O2 O2 Flow FiO2
 
     Mean Ox Delivery Rate 
 
08/24 0939  97  150/98     
 
08/24 0938  97  150/98     
 
08/24 0652 97.8 74 17 120/68  98   
 
08/24 0000      93 Room Air  
 
08/23 2203 97.7 110 17 142/78  95   
 
08/23 2113  107  148/70     
 
08/23 1750 98.2 107 24 148/70  98   
 
08/23 1555 98.6 108 18 172/97  95   
 
08/23 1545 98.3 105 16 178/99  97 Room Air  
 
 
 Intake & Output
 
 
 08/24 1600 08/24 0800 08/24 0000 08/23 1600 08/23 0800 08/23 0000
 
Intake Total  50 400 56  
 
Output Total   250 775  
 
Balance  50 150 -719  
 
       
 
Intake, IV    26  
 
Intake, Oral  50 400 30  
 
Number  0  1  
 
Bowel      
 
Movements      
 
Output, Urine   250 775  
 
Patient   224 lb 194 lb  
 
Weight      
 
Weight    Reported by Patient  
 
Measurement      
 
Method      
 
 
 
Physical Exam:
General Appearance: well developed/nourished, ill-appearing white male.  Alert, 
awake, oriented
Head: normal
HEENT: Normal
Neck: supple, JVP elevated 2 cm at 45, carotid upstrokes normal bilaterally, no
masses or thyromegaly
Respiratory: chest non-tender, decreased breath sounds right lung field
Cardiovascular: regular rate/rhythm, normal S1, S2, 1-2/6 systolic murmur
Abdomen: normal bowel sounds, soft, non-tender
Vascular: Pulses are 2+ and equal bilaterally
Neurologic: Grossly normal/nonfocal
Labs/Surinder Results:
 Laboratory Tests
 
 
 08/24 08/23
 
 0627 1442
 
Chemistry  
 
  Sodium (137 - 145 mmol/L) 138 
 
  Potassium (3.5 - 5.1 mmol/L) 3.4  L 
 
  Chloride (98 - 107 mmol/L) 101 
 
  Carbon Dioxide (22 - 30 mmol/L) 29 
 
  Anion Gap (5 - 16) 8 
 
  BUN (9 - 20 mg/dL) 13 
 
  Creatinine (0.7 - 1.2 mg/dL) 0.9 
 
  Estimated GFR (>60 ml/min) > 60 
 
  BUN/Creatinine Ratio (7 - 25 %) 14.4 
 
  Lactic Acid  Cancelled
 
  Magnesium (1.6 - 2.3 mg/dL) 1.6 
 
Hematology  
 
  CBC w Diff NO MAN DIFF REQ 
 
  WBC (4.8 - 10.8 /CUMM) 7.2 
 
  RBC (4.70 - 6.10 /CUMM) 4.80 
 
  Hgb (14.0 - 18.0 G/DL) 12.6  L 
 
  Hct (42 - 52 %) 39.1  L 
 
  MCV (80.0 - 94.0 FL) 81.5 
 
  MCH (27.0 - 31.0 PG) 26.3  L 
 
  MCHC (33.0 - 37.0 G/DL) 32.3  L 
 
  RDW (11.5 - 14.5 %) 21.5  H 
 
  Plt Count (130 - 400 /CUMM) 207 
 
  MPV (7.4 - 10.4 FL) 8.6 
 
  Gran % (42.2 - 75.2 %) 76.2  H 
 
  Lymphocytes % (20.5 - 51.1 %) 15.1  L 
 
  Monocytes % (1.7 - 9.3 %) 5.9 
 
  Eosinophils % (0 - 5 %) 2.3 
 
  Basophils % (0.0 - 2.0 %) 0.5 
 
  Absolute Granulocytes (1.4 - 6.5 /CUMM) 5.5 
 
  Absolute Lymphocytes (1.2 - 3.4 /CUMM) 1.1  L 
 
  Absolute Monocytes (0.10 - 0.60 /CUMM) 0.4 
 
  Absolute Eosinophils (0.0 - 0.7 /CUMM) 0.2 
 
  Absolute Basophils (0.0 - 0.2 /CUMM) 0 
 
 
 
 
 08/23
 
 1150
 
Chemistry 
 
  Sodium (137 - 145 mmol/L) 141
 
  Potassium (3.5 - 5.1 mmol/L) 3.1  L
 
  Chloride (98 - 107 mmol/L) 102
 
  Carbon Dioxide (22 - 30 mmol/L) 31  H
 
  Anion Gap (5 - 16) 9
 
  BUN (9 - 20 mg/dL) 11
 
  Creatinine (0.7 - 1.2 mg/dL) 0.8
 
  Estimated GFR (>60 ml/min) > 60
 
  BUN/Creatinine Ratio (7 - 25 %) 13.8
 
  Glucose (65 - 99 mg/dL) 84
 
  Lactic Acid (0.7 - 2.1 mmol/L) 1.2
 
  Calcium (8.4 - 10.2 mg/dL) 9.3
 
  Total Bilirubin (0.2 - 1.3 mg/dL) 2.9  H
 
  AST (17 - 59 U/L) 22
 
  ALT (21 - 72 U/L) 21
 
  Alkaline Phosphatase (< 127 U/L) 109
 
  Troponin I (<0.11 ng/ml) 0.04
 
  Pro-B-Natriuretic Pept (<125 pg/mL) 45952  H
 
  Total Protein (6.3 - 8.2 g/dL) 7.7
 
  Albumin (3.5 - 5.0 g/dL) 4.1
 
  Globulin (1.9 - 4.2 gm/dL) 3.6
 
  Albumin/Globulin Ratio (1.1 - 2.2 %) 1.1
 
  Amylase (30 - 110 U/L) 39
 
  Lipase (23 - 300 U/L) 21  L
 
Hematology 
 
  CBC w Diff MAN DIFF ORDERED
 
  WBC (4.8 - 10.8 /CUMM) 9.0
 
  RBC (4.70 - 6.10 /CUMM) 5.36
 
  Hgb (14.0 - 18.0 G/DL) 14.0
 
  Hct (42 - 52 %) 43.7
 
  MCV (80.0 - 94.0 FL) 81.6
 
  MCH (27.0 - 31.0 PG) 26.1  L
 
  MCHC (33.0 - 37.0 G/DL) 32.0  L
 
  RDW (11.5 - 14.5 %) 21.0  H
 
  Plt Count (130 - 400 /CUMM) 238
 
  MPV (7.4 - 10.4 FL) 8.3
 
  Gran % (42.2 - 75.2 %) 84.9  H
 
  Lymphocytes % (20.5 - 51.1 %) 9.3  L
 
  Monocytes % (1.7 - 9.3 %) 4.6
 
  Eosinophils % (0 - 5 %) 0.9
 
  Basophils % (0.0 - 2.0 %) 0.3
 
  Absolute Granulocytes (1.4 - 6.5 /CUMM) 7.6  H
 
  Absolute Lymphocytes (1.2 - 3.4 /CUMM) 0.8  L
 
  Absolute Monocytes (0.10 - 0.60 /CUMM) 0.4
 
  Absolute Eosinophils (0.0 - 0.7 /CUMM) 0.1
 
  Absolute Basophils (0.0 - 0.2 /CUMM) 0
 
  Platelet Estimate (ADEQUATE) ADEQUATE
 
  Anisocytosis 1+
 
 
 
 
Diagnostic Data
EKG Results
Unchanged
CXR Results
IMPRESSION:
1. There is been interval increase in the right-sided pleural effusion when
compared to the prior study, and adjacent underlying consolidation cannot be
excluded.
 
2. There is stable cardiomegaly. There is mild prominence of the central
pulmonary vasculature, which appears unchanged.
Other Results
Abdominal/pelvic CT:
FINDINGS:
 
LUNG BASES: There is a large right-sided pleural effusion comment
demonstrated on recent chest x-ray. There is no pleural effusion on the left.
There is atelectasis versus consolidation in the right lower and middle
lobes. Ground glass opacification is noted in the left lower zone. The
cardiac silhouette is at the upper limits of normal in size. There is no
pericardial effusion. There are coronary artery calcifications.
 
LIVER, GALLBLADDER, AND BILIARY TREE: The liver is slightly small, with a
slightly lobular contour. It has heterogenous, predominantly low density.
There are no focal abnormalities. The intrahepatic ducts are not dilated. The
gallbladder is unremarkable with no evidence of radiopaque gallstones,
gallbladder wall thickening, or obvious pericholecystic inflammatory changes.
 
 
PANCREAS: The pancreas is unremarkable.
 
SPLEEN: The spleen is at the upper limits of normal in size. Heterogenous
density may be due to timing of intravenous contrast bolus.
 
ADRENAL GLANDS: The adrenal glands are not enlarged.
 
KIDNEYS AND URETERS: The kidneys are normal in size, shape, and attenuation.
No hydronephrosis, hydroureter, or calculi seen. No perinephric stranding.
 
BLADDER: The urinary bladder is partially distended.
 
GASTROINTESTINAL TRACT: The stomach is decompressed. The loops of small bowel
are unremarkable. The appendix is not discretely identified. There is
moderate stool within the large bowel. There is no evidence of bowel
obstruction or inflammation.
 
ABDOMINAL WALL: There is diffuse increased density within the subcutaneous
fat, consistent with anasarca.
 
LYMPHOVASCULAR/MESENTERY: There is no abdominal or pelvic lymphadenopathy.
There are atheromatous calcifications of the aorta. No aneurysms are
demonstrated. There is mild perihepatic ascites. There is increased density
of the mesenteric fat, consistent with edema. There is fluid in the right
paracolic gutter, and there is moderate fluid dependently in the pelvis.
 
PELVIC VISCERA: The prostate gland is at the upper limits of normal in size.
 
OSSEOUS STRUCTURES: There are mild multilevel spondylitic changes.
 
IMPRESSION:
1. There is a large right pleural effusion. There is ascites around the
liver, along the right paracolic gutter and in the pelvis. There is anasarca,
and edematous changes are seen in the mesenteric fat.
 
2. The liver is relatively small and has a mildly lobular contour. Density is
heterogenous but appears diffusely low.
 
3. The stomach and bowel are decompressed and there is no evidence of bowel
obstruction.
 
Assessment/Plan
Assessment/Plan
Assessment:
1.  Worsening dyspnea with increasing right pleural effusion
2.  Known coronary disease, status post 2 drug-eluting stents in 2016
3.  Ischemic cardiomyopathy with ejection fraction 30-35%
4.  Abdominal discomfort with elevated bilirubin
5.  Hypokalemia
6.  Diabetes
 
Recommendations:
-Telemetry monitoring
-Limited follow-up echocardiogram to reassess left ventricular function and 
right ventricular systolic pressure
-Continue regular medications
-While in hospital, transition to IV Lasix 40 or 60 mg twice daily with close 
monitoring of intakes, outputs, daily weights.
-Monitor potassium and magnesium closely and replete as necessary
-Thoracentesis scheduled for today.
-Further discussion about long-term management.  I think the patient would 
benefit from weekly visits to the CHF clinic for IV Lasix intermittently as 
well.  Whether or not his insurance will play a role in this will have to be 
discussed.
-If pleural effusion recurs again, consider thoracic surgery consult with Dr. Ramirez for question Pleurx catheter, etc.
 
 
Consult Acknowledgment
- Thank you for your consult request.

## 2018-08-24 NOTE — RADIOLOGY REPORT
EXAMINATION:\H\
\N\XR CHEST
 
CLINICAL INFORMATION:
Status post right thoracentesis. Rule out pneumo.
 
COMPARISON:
08/23/2018
 
TECHNIQUE:
Frontal view of the chest was obtained.
 
FINDINGS:
Volume of the right pleural effusion is slightly decreased as compared to
prior. No pneumothorax. Atelectasis is present within the right lower lobe.
Within the lung bases bilaterally. There is mild pulmonary venous congestion
cardiac silhouette is enlarged and unchanged. No appreciable interstitial
edema. A left-sided effusion. Osseous structures are unremarkable.
 
IMPRESSION:
1. Decreased right pleural effusion. No pneumothorax.
2. Cardiomegaly with pulmonary venous congestion, unchanged

## 2018-08-24 NOTE — PN- HOUSESTAFF
Micheal Zhang 18 0626:
Subjective
Follow-up For:
CHF 
Complaints: abdominal pain
Tele-Events Since Last Visit:
no signifcant
Subjective:
Patient was examined bedside and he had abdominal pain. He was sitting at the 
bedside in propped up position and said the abdominal pain increases in supine 
position, and breathlessness increases in supine position
 
Review of Systems
Constitutional:
Denies: chills, diaphoresis, fever, malaise, weakness, unexplained weight loss. 
Cardiovascular:
Reports: chest pain, orthopena.  Denies: edema, palpitations, peripheral edema, 
syncope. 
Respiratory:
Reports: cough, orthopnea, short of breath.  Denies: hemoptysis, sputum 
production, stridor, wheezing. 
Gastrointestinal:
Reports: abdominal pain, distention.  Denies: constipation, diarrhea, bowel 
incontinence, melena, nausea, bloody stool, changes in stool, vomiting. 
Genitourinary:
Denies: discharge, dysuria, frequency, hematuria, hesitation, nocturia, pain, 
urgency. 
 
Objective
Last 24 Hrs of Vital Signs/I&O
 Vital Signs
 
 
Date Time Temp Pulse Resp B/P B/P Pulse O2 O2 Flow FiO2
 
     Mean Ox Delivery Rate 
 
 0904  82  126/82     
 
 0645 97.5 82 20 126/82  96 Room Air  
 
 0000       Room Air  
 
 2145 97.6 80 18 120/82  97   
 
 2007    140/72     
 
 1400       Room Air Room Air 
 
 1316 97.7 18 18 142/76  95 Room Air  
 
 
 Intake & Output
 
 
  1600  0800  0000
 
Intake Total  120 80
 
Output Total   150
 
Balance  120 -70
 
    
 
Intake, Oral  120 80
 
Output, Urine   150
 
Patient   207 lb
 
Weight   
 
 
 
 
Physical Exam
General Appearance: Alert, Oriented X3, Cooperative, No Acute Distress
Cardiovascular: Regular Rate, No Murmurs
Lungs: Clear to Auscultation, Normal Air Movement
Abdomen: Normal Bowel Sounds, Soft, No Tenderness, No Hepatospenomegaly, No 
Masses
Neurological: Normal Speech, Strength at 5/5 X4 Ext, Normal Tone, Sensation 
Intact
Extremities: No Clubbing, No Cyanosis, No Edema, Normal Pulses, No Tenderness/
Swelling
 
Assessment/Plan
Assessment:
53 year old male wth a PMH of CHF, Pleural effusion, Left shoulder pain, CAD, 
CHF, hypertension, hyperlipidemia, myocardial infarction, MI, ,  with 
stents rheumatic fever, DM pesented to the ER with SOB, orthopnea, Abdominal 
pain, COugh, No significant EKG changes, ELEVATED Bilirubin, Hypokalemia, 
elevated ProBNP- 32251 -
 
1) HFrEF- 25% 
2) Pleural effusion
3) Abdominal pain with elevated Bilirubin
4)Hypokalemia
 
Plan- 
Thoracentesis done today and fluid sent for analysis
- patient started on lasix, continue aspirin, lipitor, started on Novolog
- weight - daily, strict I/O
-supplement Potassium and monitor K
- f/u cardiology
 
 
Problem List:
 1. Abdominal pain
 
 2. Acute CHF (congestive heart failure)
 
Pain Ratin
Pain Location:
none
Pain Goal: Remain pain free
Pain Plan:
none
Tomorrow's Labs & Rationales:
pleural fluid analysis, bep, mg
 
 
Juliet MORAN,Sayda 18 1119:
Attending MD Review Statement
 
Attending Statement
Attending MD Statement: examined this patient, discuss w/resident/PA/NP, agreed 
w/resident/PA/NP, reviewed EMR data (avail)
Attending Assessment/Plan:
53M PMH COPD, CAD s/p MI, CHF (LVEF 25-30%), diabetes mellitus, frequent 
admissions for CHF, here with worsening bilateral LE edema, orthopnea, dyspnea 
on exertion, and abdominal pain.  Found to have anasarca with moderate right 
pleural effusion and cardiac ascites. [place] chest pain, abdominal pain is RLQ 
and LLQ radiating to umbilicus.  He has been having coughing episodes that lead 
to vomiting the past few days.  He reports being compliant with medications and 
diet.  On exam, he has 4+ pitting edema to both legs up to thighs, no fluid wave
present, right lung absent breath sounds and crackles on the left.  EKG NSR, 
troponin negative.  At home he takes Lasix 60mg PO BID.
 
1. Acute on chronic systolic CHF
2. Anasarca
3. Right pleural effusion
4. Hypokalemia
 
Plan
- Continue on telemetry
- Lasix 40mg IV BID, can increase to 60mg tomorrow if inadequate diuresis
- I/O, daily weights
- Cardiology consult
- Replete potassium
- Thoracentesis today
- Continue home medications
- DVT PPx

## 2018-08-25 VITALS — SYSTOLIC BLOOD PRESSURE: 118 MMHG | DIASTOLIC BLOOD PRESSURE: 82 MMHG

## 2018-08-25 VITALS — SYSTOLIC BLOOD PRESSURE: 110 MMHG | DIASTOLIC BLOOD PRESSURE: 78 MMHG

## 2018-08-25 VITALS — SYSTOLIC BLOOD PRESSURE: 126 MMHG | DIASTOLIC BLOOD PRESSURE: 82 MMHG

## 2018-08-25 NOTE — PN- HOUSESTAFF
Luis Fu 18 0918:
Subjective
Follow-up For:
CHF
Thoracentesis
Subjective:
Patient resting comforatably. No issues overnight. No events on telemetry. 
Patient feels much better after thoracentesis, stating he can breath again as it
"no longer feels like someone is sitting onmy chest." He denies any new 
complaints. 
 
Review of Systems
Constitutional:
Reports: see HPI. 
 
Objective
Last 24 Hrs of Vital Signs/I&O
 Vital Signs
 
 
Date Time Temp Pulse Resp B/P B/P Pulse O2 O2 Flow FiO2
 
     Mean Ox Delivery Rate 
 
 0638 98.0 83 18 120/86  94 Room Air  
 
 0000       Nasal 2.0L 
 
       Cannula  
 
 2213 98.7 84 20 118/82  95 Room Air  
 
 2039  79  110/78     
 
 2037      100 Nasal 2.0L 
 
       Cannula  
 
 1520 98.1 79 20 110/78  96 Room Air  
 
 0904  82  126/82     
 
 0645 97.5 82 20 126/82  96 Room Air  
 
 
 Intake & Output
 
 
  0800  0000  1600
 
Intake Total  400 500
 
Output Total  700 1325
 
Balance  -300 -825
 
    
 
Intake, Oral  400 500
 
Output, Urine  700 1325
 
Patient  207 lb 
 
Weight   
 
 
 
 
Physical Exam
General Appearance: Alert, Oriented X3, Cooperative, No Acute Distress
Skin: No Rashes
Skin Temp/Moisture Exam: Warm/Dry
HEENT: Atraumatic, EOMI
Neck: Supple
Cardiovascular: Regular Rate, Normal S1, Normal S2
Lungs: Clear to Auscultation, Normal Air Movement
Abdomen: Normal Bowel Sounds, Soft, No Tenderness
Extremities: No Cyanosis, No Tenderness/Swelling
Current Medications:
 Current Medications
 
 
  Sig/John Start time  Last
 
Medication Dose Route Stop Time Status Admin
 
Aspirin 81 MG DAILY  09 AC 
 
  PO   905
 
Atorvastatin Calcium 40 MG DAILY 900 AC 
 
  PO   906
 
Carvedilol 3.125 MG BID  2100 AC 
 
  PO   
 
Diclofenac Sodium 1 SALVATORE 4 TIMES/DAY  1737 AC 
 
  TOP   
 
Enoxaparin Sodium 40 MG DAILY  1448 AC 
 
  SC   0906
 
Finasteride 5 MG DAILY  1559 AC 
 
  PO   09
 
Furosemide 60 MG 7:30 AM, & 4:30 PM  1630 AC 
 
  IV   1630
 
Insulin Aspart 0 TIDAC  0800 AC 
 
  SC   1646
 
Lisinopril 20 MG DAILY  0900 AC 
 
  PO   0904
 
Magnesium Chloride 64 MG BID  0914 AC 
 
  PO   1000
 
Magnesium Chloride 64 MG DAILY  1137 DC 
 
  PO   0908
 
Oxycodone/ 1 TAB Q4P PRN  0930 AC 
 
Acetaminophen  PO   0355
 
Oxycodone/ 1 TAB DAILY AS NEEDED  2245 DC 
 
Acetaminophen  PO   2259
 
Potassium Chloride 40 MEQ BID  2100 AC 
 
  PO   2040
 
Potassium Chloride 40 MEQ DAILY  0900 DC 
 
  PO   0905
 
Senna 187 MG AT BEDTIME 2100 AC 
 
  PO   203
 
 
 
 
Assessment/Plan
Assessment:
53 year old male wth a PMH of CHF, Pleural effusion, CAD, CHF (HFrEF 25%), HTN, 
HLD, MI (,  with stents), RA, DM. 
 
He pesented to the ER with SOB, orthopnea, Abdominal pain, Cough.
 
Workup showed Elevated Bili, Hypokalemia, ProBNP 08729.
 
#Acute on Chronic HF (HFrEF 25%)
#Pleural Effusion
#Abd pain with elevated Bili
#Hypokalemia
 
Plan:
- Thoracentesis analysis pending
- Continue Lisinopril, Carvedilol, Lasix
- Continue Aspirin, Lipitor, 
- Continue Finasteride
- Replete K, Mg
 
- NSS/Accucheks
- Daily weights, Strick I/Os
- Supplement K 
- F/U Cardiology
 
DVT ppx; Lovenox; ALPS
Full Code
 
Problem List:
 1. Acute exacerbation of CHF (congestive heart failure)
 
Pain Ratin
Pain Location:
n/a
Pain Goal: Remain pain free
Pain Plan:
per pathway
Tomorrow's Labs & Rationales:
None
 
 
Siobhan Veronica MD 18 1157:
Attending MD Review Statement
 
Attending Statement
Attending MD Statement: examined this patient, discuss w/resident/PA/NP, agreed 
w/resident/PA/NP, reviewed EMR data (avail), discussed with nursing, amended to 
note
Attending Assessment/Plan:
Patient seen and examined.  Chart reviewed.  Resting comfortably not in acute 
distress.  No events on telemetry monitoring overnight.  Denies chest pain or 
shortness of breath.  Denies palpitations.  Requiring oxygen supplementation.  
Ultrasound-guided thoracocentesis was done yesterday with removal of 1.5 L of 
fluid from the right chest.  No pneumothorax noted postprocedure.  Improvement 
of the pleural effusion was noted on repeat imaging postprocedure.
Patient is jovial talking in complete sentences not in any respiratory distress.
 On examination diminished breath sounds both lung bases.  Abdomen is soft and 
nontender.  2-3+ peripheral edema bilaterally.
Recommendations:
-Continue current diuretic regimen of Lasix 60 mg IV twice daily.
-May need to consider discharging patient on a higher dose of Lasix upon 
discharge.
-Patient is scheduled to have repeat x-ray done next week to determine if there 
is recurrence of his pleural effusion.
-Recommend leg elevation.  I also recommend bilateral wrapping of lower 
extremities to help reduce his edema and associated complications such as 
blister and wound formation.
-Given his severe cardiomyopathy he may benefit from placement of an AICD .  
Will defer this to the cardiology service.  The lack of medical insurance is 
likely a barrier for the patient obtaining this device.  He should also be 
considered for initiation of therapy with Entresto, again will defer this to his
cardiology service.

## 2018-08-25 NOTE — RADIOLOGY REPORT
EXAMINATION:
XR PORTABLE CHEST
 
CLINICAL INFORMATION:
Increased dyspnea, status post thoracentesis
 
COMPARISON:
08/24/2018
 
TECHNIQUE:
Portable frontal view of the chest was obtained.
 
FINDINGS:
Small right pleural effusion appears slightly decreased in volume compared to
prior, with adjacent basilar parenchymal opacification. The left lung is
clear. No evidence of pneumothorax or overt pulmonary edema.
 
The cardiac silhouette remains enlarged, unchanged from prior. No acute
osseous findings are seen.
 
IMPRESSION:
Small right pleural effusion appears slightly decreased from prior, with
adjacent basilar atelectasis versus consolidation.

## 2018-08-26 VITALS — SYSTOLIC BLOOD PRESSURE: 116 MMHG | DIASTOLIC BLOOD PRESSURE: 66 MMHG

## 2018-08-26 VITALS — DIASTOLIC BLOOD PRESSURE: 86 MMHG | SYSTOLIC BLOOD PRESSURE: 120 MMHG

## 2018-08-26 VITALS — DIASTOLIC BLOOD PRESSURE: 76 MMHG | SYSTOLIC BLOOD PRESSURE: 146 MMHG

## 2018-08-26 NOTE — PN- HOUSESTAFF
Balbina MORAN,Kaushal 18 0908:
Subjective
Follow-up For:
Acute systolic heart failure leading to volume
Complaints: no complaints
Tele-Events Since Last Visit:
No any overnight events
Subjective:
Patient seen and examined at the bedside.  He does not have any active 
complaints.  He did have panic attack in the night which she described as -after
sleeping for 20 minutes, he suddenly had difficulty in the breathing gasping for
air and woke up.  He was lying flat.  He required oxygen and he think that 
oxygen did help.  Chest x-ray was done which showed improvement in the right-
sided pleural effusion.
 
Objective
Last 24 Hrs of Vital Signs/I&O
 Vital Signs
 
 
Date Time Temp Pulse Resp B/P B/P Pulse O2 O2 Flow FiO2
 
     Mean Ox Delivery Rate 
 
 0947 98.0 83  120/86     
 
 0946  83  120/86     
 
 0638 98.0 83 18 120/86  94 Room Air  
 
 0000       Nasal 2.0L 
 
       Cannula  
 
 2213 98.7 84 20 118/82  95 Room Air  
 
 2039  79  110/78     
 
 2037      100 Nasal 2.0L 
 
       Cannula  
 
 1520 98.1 79 20 110/78  96 Room Air  
 
 
 Intake & Output
 
 
  1600  08 0000
 
Intake Total  240 400
 
Output Total 450 700 700
 
Balance -450 -460 -300
 
    
 
Intake, Oral  240 400
 
Output, Urine 450 700 700
 
Patient   93.894 kg
 
Weight   
 
 
 
 
Physical Exam
General Appearance: Alert, Oriented X3, Cooperative, No Acute Distress
Cardiovascular: Normal S1, Normal S2
Lungs: decreased air entry on right side
Extremities: bilateral pitting edema
Current Medications:
 Current Medications
 
 
  Sig/John Start time  Last
 
Medication Dose Route Stop Time Status Admin
 
Aspirin 81 MG DAILY  09 AC 
 
  PO   0947
 
Atorvastatin Calcium 40 MG DAILY  0900 AC 
 
  PO   0946
 
Carvedilol 3.125 MG BID  2100 AC 
 
  PO   0947
 
Diclofenac Sodium 1 SALVATORE 4 TIMES/DAY  1737  
 
  TOP   2044
 
Enoxaparin Sodium 40 MG DAILY  1448 AC 
 
  SC   0948
 
Finasteride 5 MG DAILY  1559 AC 
 
  PO   0948
 
Furosemide 60 MG 7:30 AM, & 4:30 PM  1630 AC 
 
  IV   0800
 
Insulin Aspart 0 TIDAC  0800 AC 
 
  SC   1646
 
Lisinopril 20 MG DAILY  0900 AC 
 
  PO   0946
 
Magnesium Chloride 64 MG BID  0914 AC 
 
  PO   0943
 
Magnesium Chloride 64 MG DAILY  1137 DC 
 
  PO   0908
 
Oxycodone/ 1 TAB Q4P PRN  0930 AC 
 
Acetaminophen  PO   0355
 
Oxycodone/ 1 TAB DAILY AS NEEDED  2245 DC 
 
Acetaminophen  PO   2259
 
Potassium Chloride 40 MEQ BID  2100 AC 
 
  PO   0946
 
Senna 187 MG AT BEDTIME  2100 AC 
 
  PO   2039
 
 
 
 
Last 24 Hrs of Lab/Surinder Results
Last 24 Hrs of Labs/Mics:
 Laboratory Tests
 
18 0728:
Anion Gap 6, Estimated GFR > 60, BUN/Creatinine Ratio 19.0, Magnesium 1.7
 
 
Assessment/Plan
Assessment:
Patient is a 53-year-old male presented with chief complaints of shortness of 
breath and abdominal pain since last 1 week.
 
Past medical history -
CAD w/ MI X2 s/p PCI with drug-eluting stent placement (, ), most recent
cardiac cath in 2016 with Dr. Adalberto Esqueda (2 drug-eluting stents to the 
RCA on 2 to the LAD both for 80% stenosis)
HFrEF (LVEF 25-30% on 16); right-sided pleural effusion(paracentesis 
2016)
Type 2 diabetes
Hyperlipidemia
Hypertension
History of Guillain-Dow syndrome
Migraines
 
Current problem-
Large right-sided pleural effusion-improving
Bilateral lower extremity edema
Acute on chronic heart failure with reduced ejection fraction of 25-30%
Abdomen pain secondary to muscular pain
Type 2 diabetes
 
Assessment and plan-
* We will repeat the chest x-ray tomorrow
* We will call the radiology, if he again needed IR guided right-sided pleural 
effusion -thoracentesis
* We will follow cardiology recommendation regarding discharge planning and 
further management
* Daily weight measurement
* Strict intake output charting
* Continue to elevation of the lower leg
* I did  the patient regarding low sodium intake
* We will follow the limited echocardiogram
* CODE STATUS-full code
* Diet-heart healthy diet with fluid restriction and sodium restriction to 2 g
* DVT prophylaxis-ALP S/heparin
 
 
 
 
Problem List:
 1. Pleural effusion
 
 2. Acute CHF (congestive heart failure)
 
Pain Ratin
Pain Location:
abdoman
Pain Goal: Remain pain free
Pain Plan:
avoid NSAIds
Tomorrow's Labs & Rationales:
f/u CBC,BEP
DVT/Prophylaxis: mechanical, pharmacological
 
 
Siobhan Veronica MD 18 0958:
Attending MD Review Statement
 
Attending Statement
Attending MD Statement: examined this patient, discuss w/resident/PA/NP, agreed 
w/resident/PA/NP, reviewed EMR data (avail), discussed with nursing, amended to 
note
Attending Assessment/Plan:
Patient seen and examined.  Resting comfortably not in any acute distress.  
Finally last night he had an episode of acute shortness of breath.  Chest x-ray 
was repeated showed no increase in the size of his pleural effusion.  He was 
placed briefly on oxygen supplementation which he has taken off a little longer 
utilizing.  Reports feeling comfortable this morning.  Sitting up in bed 
speaking in complete sentences.  On examination he continues to have diminished 
breath sounds bilaterally also in the right lung base.  Continues have bilateral
lower extremity edema 2-3+.
 
Recommendations:
-Given that the pleural effusion is stable and his x-ray would recommend 
continue patient on same dose of Lasix 60 mg IV twice daily today.  Follow-up 
with cardiology service regarding discharge planning.
-Recommend leg elevation while in bed.  Recommend bilateral lower extremity 
compression wrapping.
-Follow-up with his cardiology service regarding placement of an AICD and also 
initiation of therapy with Entresto.

## 2018-08-26 NOTE — ECHOCARDIOGRAM REPORT
MORA SHINE 
 
 Age:    53     :    1965      Gender:     M 
 
 MRN:    [id number] 
 
 Exam Date:     2018  
                11:34 
 
 Exam Location: 1  
 [place] 
 
 Ht (in):     73      Wt (lb):      223     BSA:    2.30 
 
 BP:          150     /     98 
 
 Ordering Physician:        Sara Mortensen MD 
 
 Referring Physician:       Sara Mortensen MD 
 
 Technologist:              Ester Blood [facility] 
 
 Room Number:               182 
 
 Indications: 
 
 Rhythm:                 Sinus 
 
 Technical Quality:      good 
 
 
 FINDINGS 
 Left Ventricle 
 Severe global left ventricular hypokinesis with inferior wall  
 akinesis. Restrictive diastolic filling pattern with high filling  
 pressures. LVEF estimated at 20%. 
 
 Right Ventricle 
 Mildly enlarged right ventricle, with mild hypokinesis. 
 
 Right Atrium 
 enlarged RA. 
 
 Left Atrium 
 Enlarged LA with intact interatrial septum. 
 
 Mitral Valve 
 Mild mitral regurgitation. 
 
 Aortic Valve 
 Tricuspid aortic valve without significant sclerosis or stenosis. 
 
 Tricuspid Valve 
 Trace tricuspid regurgitation. 
 
 Pulmonic Valve 
 trace Pulmonic regurgitation. 
 
 Pericardium 
 Absence of pericardial effusion. 
 
 Great Vessels 
 Normal aortic root size and arch. 
 
 CONCLUSIONS 
 Severe global left ventricular hypokinesis with inferior wall  
 akinesis. 
 Restrictive diastolic filling pattern with high filling pressures. 
 LVEF estimated at 20%. 
 Mildly enlarged right ventricle, with mild hypokinesis. 
 Enlarged RA. 
 Enlarged LA with intact interatrial septum. 
 Mild mitral regurgitation. 
 Tricuspid aortic valve without significant sclerosis or stenosis. 
 Trace tricuspid regurgitation. 
 Trace Pulmonic regurgitation. 
 Absence of pericardial effusion. 
 Normal aortic root size and arch. 
 
 Monty Stone M.D. 
 (Electronically Signed) 
 Final Date:      2018  
                  16:44 
 
 MEASUREMENTS  (Male / Female) Normal Values 
 
 2D ECHO 
 LV Diastolic Diameter PLAX                          5.5 cm           4.2 - 5.9 
/ 3.9 - 5.3 cm 
 LV Systolic Diameter PLAX                           5.0 cm           2.1 - 4.0 
cm 
 LV Fractional Shortening PLAX                       9.1 %            25 - 46  %
 
 LV Ejection Fraction 2D Teich                       19.8 %            
 IVS Diastolic Thickness                             1.4 cm            
 LVPW Diastolic Thickness                            1.1 cm            
 LV Relative Wall Thickness                          0.5               
 LVOT Diameter                                       1.6 cm            
 Aortic Root Diameter                                2.7 cm            
 LA Systolic Diameter LX                             4.4 cm           3.0 - 4.0 
/ 2.7 - 3.8 cm 
 LA Volume                                           80.0 cm         18 - 58 / 
22 - 52 cm 
 
 DOPPLER 
 AV Peak Velocity                                    114.0 cm/s        
 AV Peak Gradient                                    5.2 mmHg          
 LVOT Peak Velocity                                  55.3 cm/s         
 LVOT Peak Gradient                                  1.2 mmHg          
 AV Area Cont Eq pk                                  1.0 cm           
 Mitral E Point Velocity                             96.7 cm/s         
 Mitral A Point Velocity                             36.0 cm/s         
 Mitral E to A Ratio                                 2.7               
 MV Deceleration Time                                310.0 ms          
 TR Peak Velocity                                    280.0 cm/s        
 TR Peak Gradient                                    31.4 mmHg         
 PV Peak Velocity                                    65.2 cm/s         
 PV Peak Gradient                                    1.7 mmHg          
 LV E' Lateral Velocity                              5.4 cm/s          
 Mitral E to LV E' Lateral Ratio                     18.0              
 LV E' Septal Velocity                               3.0 cm/s          
 Mitral E to LV E' Septal Ratio                      32.0

## 2018-08-26 NOTE — PN- CARDIOLOGY
Subjective
Subjective:
Patient is stable, no acute events. Sitting in chair. No significant events on 
telemetry.
 
No evidence of malignancy on cytology of pleural fluid.
 
Objective
Vital Signs and I&Os
Vital Signs
 
 
Date Time Temp Pulse Resp B/P B/P Pulse O2 O2 Flow FiO2
 
     Mean Ox Delivery Rate 
 
08/26 0947 98.0 83  120/86     
 
08/26 0946  83  120/86     
 
08/26 0638 98.0 83 18 120/86  94 Room Air  
 
08/26 0000       Nasal 2.0L 
 
       Cannula  
 
08/25 2213 98.7 84 20 118/82  95 Room Air  
 
08/25 2039  79  110/78     
 
08/25 2037      100 Nasal 2.0L 
 
       Cannula  
 
08/25 1520 98.1 79 20 110/78  96 Room Air  
 
 
 Intake & Output
 
 
 08/26 1600 08/26 0800 08/26 0000 08/25 1600 08/25 0800 08/25 0000
 
Intake Total  240 400 500 120 80
 
Output Total    150
 
Balance  -460 -300 -825 120 -70
 
       
 
Intake, Oral  240 400 500 120 80
 
Output, Urine    150
 
Patient   207 lb   207 lb
 
Weight      
 
 
 
Physical Exam:
General Appearance: Alert, Oriented X3, Cooperative, No Acute Distress
Cardiovascular: Regular Rate, No Murmurs
Lungs: Clear to Auscultation, Normal Air Movement
Abdomen: Normal Bowel Sounds, Soft, No Tenderness, No Hepatospenomegaly, No 
Masses
Neurological: Normal Speech, Strength at 5/5 X4 Ext, Normal Tone, Sensation 
Intact
Extremities: No Clubbing, No Cyanosis, No Edema, Normal Pulses, No Tenderness/
Swelling
Current Medications:
 Current Medications
 
 
  Sig/John Start time  Last
 
Medication Dose Route Stop Time Status Admin
 
Aspirin 81 MG DAILY 08/24 0900 AC 08/26
 
  PO   0947
 
Atorvastatin Calcium 40 MG DAILY 08/24 0900 AC 08/26
 
  PO   0946
 
Carvedilol 3.125 MG BID 08/23 2100 AC 08/26
 
  PO   0947
 
Diclofenac Sodium 1 SALVATORE 4 TIMES/DAY 08/23 1737 AC 08/25
 
  TOP   2044
 
Enoxaparin Sodium 40 MG DAILY 08/23 1448 AC 08/26
 
  SC   0948
 
Finasteride 5 MG DAILY 08/23 1559 AC 08/26
 
  PO   0948
 
Furosemide 60 MG 7:30 AM, & 4:30 PM 08/23 1630 AC 08/26
 
  IV   0800
 
Insulin Aspart 0 TIDAC 08/24 0800 AC 08/24
 
  SC   1646
 
Lisinopril 20 MG DAILY 08/24 0900 AC 08/26
 
  PO   0946
 
Magnesium Chloride 64 MG BID 08/25 0914 AC 08/26
 
  PO   0943
 
Magnesium Chloride 64 MG DAILY 08/24 1137 DC 08/25
 
  PO   0908
 
Oxycodone/ 1 TAB Q4P PRN 08/24 0930 AC 08/26
 
Acetaminophen  PO   0355
 
Oxycodone/ 1 TAB DAILY AS NEEDED 08/23 2245 DC 08/23
 
Acetaminophen  PO   2259
 
Potassium Chloride 40 MEQ BID 08/25 2100 AC 08/26
 
  PO   0946
 
Senna 187 MG AT BEDTIME 08/25 2100 AC 08/25
 
  PO   2039
 
 
 
 
Results
Last 48 Hrs of Labs/Mics:
 Laboratory Tests
 
08/26/18 0728:
Anion Gap 6, Estimated GFR > 60, BUN/Creatinine Ratio 19.0, Magnesium 1.7
 
08/24/18 2315:
Anion Gap 9, Estimated GFR > 60, BUN/Creatinine Ratio 18.0, Magnesium 1.6, 
Lactate Dehydrogenase 458
 
08/24/18 1225:
Pleural pH 7.48
 
08/24/18 1225:
Fluid   H, Fld Mesothelial Cells   , Fld Total RBCs Counted 1777  H
 
08/24/18 1225:
Lymphocytes 38, % Normal PMNs 11, Phlebotomy Draw Site RIGHT PLEURAL, Fluid 
Glucose 113, Fluid Total Protein 2.3, Fluid Albumin 1.1, Fluid , Fluid 
Amylase < 30
 
08/24/18 1143:
Lactate Dehydrogenase Cancelled
 
 
Assessment/Plan
Assessment/Plan
Recurrent right pleural effusion in patient with ischemic cardiomyopathy.
1.5L fluid drained on 08/24, without complications. No evidence of malignancy on
cytology.
Continue lasix 60 mg IV bid, repeat CXR tomorrow or tuesday, evaluating for 
reaccumulation.
Continue telemetry? Yes

## 2018-08-27 VITALS — SYSTOLIC BLOOD PRESSURE: 148 MMHG | DIASTOLIC BLOOD PRESSURE: 80 MMHG

## 2018-08-27 VITALS — SYSTOLIC BLOOD PRESSURE: 132 MMHG | DIASTOLIC BLOOD PRESSURE: 80 MMHG

## 2018-08-27 VITALS — SYSTOLIC BLOOD PRESSURE: 114 MMHG | DIASTOLIC BLOOD PRESSURE: 70 MMHG

## 2018-08-27 VITALS — DIASTOLIC BLOOD PRESSURE: 80 MMHG | SYSTOLIC BLOOD PRESSURE: 138 MMHG

## 2018-08-27 VITALS — SYSTOLIC BLOOD PRESSURE: 124 MMHG | DIASTOLIC BLOOD PRESSURE: 80 MMHG

## 2018-08-27 NOTE — RADIOLOGY REPORT
EXAMINATION:
XR CHEST
 
CLINICAL INFORMATION:
Pleural effusion. Assess for reaccumulation
 
COMPARISON:
Immediate post thoracentesis chest radiograph 08/24/2018, follow-up chest
radiograph 08/25/2018
 
TECHNIQUE:
2 views of the chest were obtained.
 
FINDINGS:
When compared with the most recent prior study as well as the immediate post
thoracentesis chest radiograph, the small right-sided pleural effusion and
associated basilar parenchymal opacification is unchanged. The left lung is
clear. No evidence of pneumothorax bilaterally.
 
The cardiomediastinal silhouette is unchanged and demonstrates stable
cardiomegaly.
 
No acute osseous abnormality.
 
IMPRESSION:
Stable small right pleural effusion with associated basilar consolidation.

## 2018-08-27 NOTE — PN- HOUSESTAFF
Micheal Zhang 18 0719:
Subjective
Follow-up For:
Acute systolic heart failure leading to volume
Complaints: patient c/o aBDOMINAL PAIN AND SOB 
Tele-Events Since Last Visit:
Normasl sinus rhythm, HR in 80s, persistant QRS complex changes. 
Subjective:
Patient complains of 2 events of SOB, Cough yesterday evening and felt like 
throwing up T 8pm yesterday. wanted to know if he was having another tap today. 
c/o abdominal pain which worsens on lying down. swelling in th efeet persistent 
and hurts
 
Review of Systems
Constitutional:
Denies: chills, diaphoresis, fever, malaise, weakness, unexplained weight loss. 
Cardiovascular:
Reports: edema, orthopena, peripheral edema.  Denies: chest pain, palpitations, 
syncope. 
Respiratory:
Reports: cough, orthopnea, short of breath.  Denies: hemoptysis, sputum 
production, stridor, wheezing. 
Gastrointestinal:
Denies: abdominal pain, diarrhea, vomiting. 
Genitourinary:
Denies: discharge, dysuria, frequency, hematuria, hesitation, nocturia, pain, 
urgency. 
 
Objective
Last 24 Hrs of Vital Signs/I&O
 Vital Signs
 
 
Date Time Temp Pulse Resp B/P B/P Pulse O2 O2 Flow FiO2
 
     Mean Ox Delivery Rate 
 
 1423 98.2 93 20 114/70  95 Room Air  
 
 1135  88  138/80     
 
 0845  88  148/80  94 Room Air  
 
 0800       Room Air  
 
 0719 98.7 110 18 124/80  90 Room Air  
 
 2217 98.5 89 18 146/76  94 Room Air  
 
 2059  89  146/76     
 
 1530 97.8 75 18 116/66  93 Room Air  
 
 
 Intake & Output
 
 
  1600  0800  0000
 
Intake Total  660 440
 
Output Total 900 1100 950
 
Balance -900 -440 -510
 
    
 
Intake, Oral  660 440
 
Output, Urine 900 1100 950
 
Patient   206 lb
 
Weight   
 
 
 
 
Physical Exam
General Appearance: Alert, Oriented X3, Cooperative, No Acute Distress
Cardiovascular: Normal S1, Normal S2, No Murmurs
Lungs: decreased breath sounds in the right basal area of the lung. 
Abdomen: Soft, No Tenderness
Neurological: Normal Speech, Strength at 5/5 X4 Ext, Normal Tone, Sensation 
Intact
Extremities: b/l pedal edema present
Current Medications:
 Current Medications
 
 
  Sig/John Start time  Last
 
Medication Dose Route Stop Time Status Admin
 
Aspirin 81 MG DAILY  09 AC 
 
  PO   1132
 
Atorvastatin Calcium 40 MG DAILY  09 AC 
 
  PO   1133
 
Carvedilol 3.125 MG BID  2100 AC 
 
  PO   1132
 
Diclofenac Sodium 1 SALVATORE 4 TIMES/DAY  1737 AC 
 
  TOP   1728
 
Enoxaparin Sodium 40 MG DAILY  1448 DC 
 
  SC   1133
 
Finasteride 5 MG DAILY  1559 AC 
 
  PO   1133
 
Furosemide 80 MG 7:30 AM, & 4:30 PM  1630 AC 
 
  IV   
 
Furosemide 60 MG 7:30 AM, & 4:30 PM  1630 DC 
 
  IV   0844
 
Insulin Aspart 0 TIDAC  08 AC 
 
  SC   1646
 
Lisinopril 20 MG DAILY  09 AC 
 
  PO   1133
 
Magnesium Chloride 64 MG BID  0914 AC 
 
  PO   1133
 
Melatonin 10 MG AT BEDTIME  2100 AC 
 
  PO   
 
Ondansetron HCl 4 MG ONCE ONE  0215 DC 
 
  PO  0216  
 
Oxycodone/ 1 TAB Q4P PRN  0930 AC 
 
Acetaminophen  PO   1413
 
Potassium Chloride 40 MEQ BID 2100 AC 
 
  PO   1133
 
Senna 187 MG AT BEDTIME  2100 AC 
 
  PO   2039
 
 
 
 
Last 24 Hrs of Lab/Surinder Results
Last 24 Hrs of Labs/Mics:
 Laboratory Tests
 
18 0655:
Anion Gap 9, Estimated GFR > 60, BUN/Creatinine Ratio 20.0, Magnesium 1.7, Total
Bilirubin 1.5  H, Direct Bilirubin 0.8  H, AST 19, ALT 23, Alkaline Phosphatase 
93, Total Protein 6.4, Albumin 3.4  L
 
 
Assessment/Plan
Assessment:
 
Patient is a 53-year-old male with PMH CAD, HF ReF, right-sided pleural effusion
, type II DM, HLD, HTN, presented with chief complaints of shortness of breath 
and abdominal pain since last 1 week.
 
Problems
Right-sided pleural effusion post thoracentesis on Friday
Bilateral pedal edema
HFR EF 25%
 
plan
Repeat chest x-ray- Small right pleural effusion appears slightly decreased from
prior, with
adjacent basilar atelectasis versus consolidation.
Possible thoracentesis tomorrow
Increased Lasix to 80 mg IV twice daily
Manage electrolytes as needed
Follow-up BEP tomorrow
If abdominal pain persists, consult GI
Daily weight measurement
Strict I/O charting
CODE STATUSfull code
Heart healthy diet with sodium restriction
DVT prophylaxisAlps/heparin
Problem List:
 1. HFrEF (heart failure with reduced ejection fraction)
 
 2. Pleural effusion
 
Pain Ratin
Pain Location:
Abdominal pain
Pain Goal: Remain pain free
Pain Plan:
None
Tomorrow's Labs & Rationales:
BEP, magnesium
 
 
RodrigoDaocarson 18 1515:
Attending MD Review Statement
 
Attending Statement
Attending MD Statement: examined this patient, discuss w/resident/PA/NP, agreed 
w/resident/PA/NP, reviewed EMR data (avail), discussed with case mgmt
Attending Assessment/Plan:
Acute on chronic systolic chf with EF of 20%. d/w cardiology and pt the care 
plan. cardiology recommending increasing the lasix dose to 80mg iv bid for now. 
will see how he does with that. Pt cousnelled about low sodium diet as well as 
fluid restriction. Will get dietary consult . 
 
Rt side pleural effusion s/p thoracentesis- 1.5 L fluid removed. Repeat cxr 
showing small rt side pleural effusion 
 
ABdominal pain - pt says it happens after episodes of retching and vomiting. If 
persists will get GI consult. His LFTs are normal except bili being slightly 
high but is trending down. COuld have some liver disease secondary to chf. 
 
d/w pt the care plan.

## 2018-08-27 NOTE — PN- CARDIOLOGY
Subjective
Subjective:
The patient continues to complain of significant shortness of breath and 
orthopnea.  No chest pain.  No palpitations.  No diaphoresis.  No nausea or 
vomiting.
 
Objective
Vital Signs and I&Os
Vital Signs
 
 
Date Time Temp Pulse Resp B/P B/P Pulse O2 O2 Flow FiO2
 
     Mean Ox Delivery Rate 
 
08/27 1135  88  138/80     
 
08/27 0845  88  148/80  94 Room Air  
 
08/27 0719 98.7 110 18 124/80  90 Room Air  
 
08/26 2217 98.5 89 18 146/76  94 Room Air  
 
08/26 2059  89  146/76     
 
08/26 1530 97.8 75 18 116/66  93 Room Air  
 
 
 Intake & Output
 
 
 08/27 1600 08/27 0800 08/27 0000 08/26 1600 08/26 0800 08/26 0000
 
Intake Total  660 440 500 240 400
 
Output Total  1100 950 940 700 700
 
Balance  -440 -510 -440 -460 -300
 
       
 
Intake, Oral  660 440 500 240 400
 
Output, Urine  1100 950 940 700 700
 
Patient   206 lb   207 lb
 
Weight      
 
 
 
Physical Exam:
Gen: NAD
HEENT: normal
Lungs: Bilateral rales, normal resp. effort
Heart: RRR, S1, S2, 1/6 systolic murmur
Abdomen: Soft, nontender, no masses
Extremities: 2+ edema bilaterally
Neuro: Alert and oriented x 3, cranial nerves intact
Current Medications:
 Current Medications
 
 
  Sig/John Start time  Last
 
Medication Dose Route Stop Time Status Admin
 
Aspirin 81 MG DAILY 08/24 0900 AC 08/27
 
  PO   1132
 
Atorvastatin Calcium 40 MG DAILY 08/24 0900 AC 08/27
 
  PO   1133
 
Carvedilol 3.125 MG BID 08/23 2100 AC 08/27
 
  PO   1132
 
Diclofenac Sodium 1 SALVATORE 4 TIMES/DAY 08/23 1737 AC 08/26
 
  TOP   1728
 
Enoxaparin Sodium 40 MG DAILY 08/23 1448  08/27
 
  SC   1133
 
Finasteride 5 MG DAILY 08/23 1559 AC 08/27
 
  PO   1133
 
Furosemide 60 MG 7:30 AM, & 4:30 PM 08/23 1630 AC 08/27
 
  IV   0844
 
Insulin Aspart 0 TIDAC 08/24 0800 AC 08/24
 
  SC   1646
 
Lisinopril 20 MG DAILY 08/24 0900 AC 08/27
 
  PO   1133
 
Magnesium Chloride 64 MG BID 08/25 0914 AC 08/27
 
  PO   1133
 
Ondansetron HCl 4 MG ONCE ONE 08/27 0215 DC 
 
  PO 08/27 0216  
 
Oxycodone/ 1 TAB Q4P PRN 08/24 0930 AC 08/27
 
Acetaminophen  PO   0918
 
Potassium Chloride 40 MEQ BID 08/25 2100 AC 08/27
 
  PO   1133
 
Senna 187 MG AT BEDTIME 08/25 2100 AC 08/25
 
  PO   2039
 
 
 
 
Results
Last 48 Hrs of Labs/Mics:
 Laboratory Tests
 
08/27/18 0655:
Anion Gap 9, Estimated GFR > 60, BUN/Creatinine Ratio 20.0, Magnesium 1.7, Total
Bilirubin 1.5  H, Direct Bilirubin 0.8  H, AST 19, ALT 23, Alkaline Phosphatase 
93, Total Protein 6.4, Albumin 3.4  L
 
08/26/18 0728:
Anion Gap 6, Estimated GFR > 60, BUN/Creatinine Ratio 19.0, Magnesium 1.7
 
Recent Imaging Studies:
Chest x-ray 8/25/18:
Small right pleural effusion appears slightly decreased from prior, with
adjacent basilar atelectasis versus consolidation.
 
Assessment/Plan
Assessment/Plan
Assessment:
1.  Worsening dyspnea with increasing right pleural effusion
2.  Known coronary disease, status post 2 drug-eluting stents in 2016
3.  Ischemic cardiomyopathy with ejection fraction 30-35%
4.  Abdominal discomfort with elevated bilirubin
5.  Hypokalemia
6.  Diabetes
 
Plan:
* Would increase Lasix to 80 mg IV every 12 hours
* Monitor input and output with daily weights
* With the electrolytes as needed
* Check basic metabolic profile daily
* Continue other cardiac medications
Continue telemetry? Yes

## 2018-08-28 VITALS — DIASTOLIC BLOOD PRESSURE: 64 MMHG | SYSTOLIC BLOOD PRESSURE: 110 MMHG

## 2018-08-28 VITALS — DIASTOLIC BLOOD PRESSURE: 84 MMHG | SYSTOLIC BLOOD PRESSURE: 138 MMHG

## 2018-08-28 VITALS — DIASTOLIC BLOOD PRESSURE: 60 MMHG | SYSTOLIC BLOOD PRESSURE: 110 MMHG

## 2018-08-28 PROCEDURE — 0W993ZZ DRAINAGE OF RIGHT PLEURAL CAVITY, PERCUTANEOUS APPROACH: ICD-10-PCS

## 2018-08-28 NOTE — PN- HOUSESTAFF
Tiara Kaminski 18 1146:
Subjective
Follow-up For:
Acute systolic heart failure leading to volume
Subjective:
Afebrile overnight. Patient is seen and examined in bed. Patient scheduled to go
for a repeat thoracentesis today with IR. Patient states he still has leg 
swelling with some tenderness noted. Patient notes insomnia last night, unable 
to get more than 2 hours of sleep with only melatonin given. Patient otherwise 
denies any chest pain, shortness of breath, fevers, and chills.
 
Review of Systems
Constitutional:
Reports: see HPI. 
 
Objective
Last 24 Hrs of Vital Signs/I&O
 Vital Signs
 
 
Date Time Temp Pulse Resp B/P B/P Pulse O2 O2 Flow FiO2
 
     Mean Ox Delivery Rate 
 
 0631 98.7 76 20 138/84  96 Room Air  
 
 0000       Room Air  
 
 2220 98.4 91 20 132/80  97 Room Air  
 
 2040  88  140/80     
 
 1600       Room Air  
 
 1423 98.2 93 20 114/70  95 Room Air  
 
 
 Intake & Output
 
 
  1600  0800  0000
 
Intake Total  30 440
 
Output Total   700
 
Balance  30 -260
 
    
 
Intake, IV  30 
 
Intake, Oral   440
 
Output, Urine   700
 
Patient   200 lb
 
Weight   
 
Weight   Bed scale
 
Measurement   
 
Method   
 
 
 
 
Physical Exam
General Appearance: Alert, Oriented X3, Cooperative, No Acute Distress
Skin: No Rashes, No Breakdown
HEENT: Atraumatic
Neck: Supple, No JVD
Cardiovascular: Regular Rate, Normal S1, Normal S2
Lungs: Clear to Auscultation
Abdomen: Soft
Neurological: Normal Speech
Extremities: b/l lower extremity edema extending to knees
 
Assessment/Plan
Assessment:
XRY-CHEST XRAY, SINGLE VIEW  -
The small right pleural effusion has decreased after recent thoracentesis. No
evidence of pneumothorax.
 
53-year-old male with PMH CAD, HF ReF, right-sided pleural effusion, type II DM,
HLD, HTN, presented with chief complaints of shortness of breath and abdominal 
pain since last 1 week.
 
Problems
Right-sided pleural effusion post thoracentesis on Friday
Bilateral pedal edema
HFR EF 25%
 
#Right-sided pleural effusion, s/p thoracentesis, repeated 
-Repeat chest x-ray: small right pleural effusion has decreased after recent 
thoracentesis. No
evidence of pneumothorax.
-Increased Lasix to 80 mg IV q8 to improve diuresis
-Manage electrolytes as needed; Magnesium followed with normal values noted
-Follow-up BEP tomorrow
-If abdominal pain persists, consult GI
-Daily weight measurement
-Pt. had repeat thoracentesis with IR 
-Strict I/O charting
 
 
FC
Heart healthy diet with sodium restriction
DVT prophylaxis
Problem List:
 1. HFrEF (heart failure with reduced ejection fraction)
 
 2. Pleural effusion
 
Pain Ratin
Pain Location:
na
Pain Goal: Remain pain free
Pain Plan:
na
Tomorrow's Labs & Rationales:
routine
 
 
Melba Wallacemichell 18 1210:
Attending MD Review Statement
 
Attending Statement
Attending MD Statement: examined this patient, discuss w/resident/PA/NP, agreed 
w/resident/PA/NP, reviewed EMR data (avail), discussed with case mgmt
Attending Assessment/Plan:
Acute on chronic systolic chf with EF of 20%. d/w cardiology and pt the care 
plan. cardiology recommending increasing the lasix dose to 80mg iv bid on . 
cont that for now.  Pt cousnelled about low sodium diet as well as fluid 
restriction.  dietary consult placed. 
 
Rt side pleural effusion s/p thoracentesis- 1.5 L fluid removed earlier this 
admission on . Repeat cxr showing small rt side pleural effusion and pt is 
going for repeat thoracentesis today by IR on . 
 
ABdominal pain - pt says it happens after episodes of retching and vomiting. If 
persists will get GI consult. His LFTs are normal except bili being slightly 
high but is trending down. COuld have some liver disease secondary to chf. 
 
d/w pt the care plan.

## 2018-08-28 NOTE — ULTRASOUND REPORT
PROCEDURE:
Thoracentesis
 
CLINICAL INFORMATION:
Pleural Effusion
 
COMPARISON:
Chest x-ray 08/27/2018 and thoracentesis 08/24/2018
 
TECHNIQUE:
Indirect ultrasound guided thoracentesis using a 5 [place] Yueh catheter.
 
FINDINGS:
Informed consent was obtained from the patient prior to the procedure. During
this process, the procedure alternatives were explained, along with the
intended outcome and benefits. The risks of the procedure, as well as the
risk of not doing the procedure, was discussed. The patient was given the
opportunity to ask questions regarding the procedure and appeared competent
to make medical decisions. A signed consent form which documents this
discussion was placed in the medical record. A timeout procedure was
performed.
 
Ultrasound evaluation of the chest for pleural fluid was performed.  A
moderate pleural effusion is noted on the right side. Using standard
interventional and sterile techniques, lidocaine was used to anesthetize the
region.  A 5 [place] Yueh catheter was introduced into the right  pleural
fluid using standard safety needle technique. Approximately 1.6 L of light
yellow  fluid was removed into the Vacutainer bottles. The catheter was then
removed. Good hemostasis was achieved.
 
The patient tolerated the procedure well. A sterile dressing was placed. The
patient was discharged from the department in stable condition. Postprocedure
x-ray demonstrated no pneumothorax.
 
COMPLICATIONS:
None.
 
 
IMPRESSION:
Successful ultrasound-guided thoracentesis yielding 1.6 L of fluid.

## 2018-08-28 NOTE — RADIOLOGY REPORT
EXAMINATION:\H\
\N\XR CHEST
 
CLINICAL INFORMATION:
Status post right thoracentesis. Evaluate for pneumothorax.
 
COMPARISON:
CXR from 08/27/2018
 
TECHNIQUE:
Frontal view of the chest was obtained.
 
FINDINGS:
Again noted is cardiomegaly, mild prominence of central pulmonary vessels and
scattered septal lines, suspicious for mild edema. Small right pleural
effusion is present. The pleural effusion has decreased after the recent
thoracentesis. No pneumothorax.
 
IMPRESSION:
The small right pleural effusion has decreased after recent thoracentesis. No
evidence of pneumothorax.

## 2018-08-28 NOTE — PN- CARDIOLOGY
Subjective
Subjective:
Shortness of breath is improving.  He still has a very significant lower 
extremity edema.  No chest pain.  He had a thoracentesis today, and he notes 
sharp pains in the chest status post thoracentesis.  No diaphoresis.  No nausea 
or vomiting.  No palpitations
 
Objective
Vital Signs and I&Os
Vital Signs
 
 
Date Time Temp Pulse Resp B/P B/P Pulse O2 O2 Flow FiO2
 
     Mean Ox Delivery Rate 
 
08/28 0631 98.7 76 20 138/84  96 Room Air  
 
08/28 0000       Room Air  
 
08/27 2220 98.4 91 20 132/80  97 Room Air  
 
08/27 2040  88  140/80     
 
08/27 1600       Room Air  
 
08/27 1423 98.2 93 20 114/70  95 Room Air  
 
 
 Intake & Output
 
 
 08/28 1600 08/28 0800 08/28 0000 08/27 1600 08/27 0800 08/27 0000
 
Intake Total  30 440 590 660 440
 
Output Total   700 1700 1100 950
 
Balance  30 -260 -1110 -440 -510
 
       
 
Intake, IV  30  30  
 
Intake, Oral   440 560 660 440
 
Output, Urine   700 1700 1100 950
 
Patient   200 lb   206 lb
 
Weight      
 
Weight   Bed scale   
 
Measurement      
 
Method      
 
 
 
Physical Exam:
Gen: NAD
HEENT: normal
Lungs: Bilateral rales, normal resp. effort
Heart: RRR, S1, S2, 1/6 systolic murmur
Abdomen: Soft, nontender, no masses
Extremities: 2+ edema bilaterally
Neuro: Alert and oriented x 3, cranial nerves intact
Current Medications:
 Current Medications
 
 
  Sig/John Start time  Last
 
Medication Dose Route Stop Time Status Admin
 
Aspirin 81 MG DAILY 08/24 0900 AC 08/28
 
  PO   1122
 
Atorvastatin Calcium 40 MG DAILY 08/24 0900 AC 08/28
 
  PO   1123
 
Carvedilol 3.125 MG BID 08/23 2100 AC 08/28
 
  PO   1122
 
Diclofenac Sodium 1 SALVATORE 4 TIMES/DAY 08/23 1737 AC 08/26
 
  TOP   1728
 
Enoxaparin Sodium 40 MG DAILY 08/23 1448 DC 08/27
 
  SC   1133
 
Finasteride 5 MG DAILY 08/23 1559 AC 08/28
 
  PO   1123
 
Furosemide 80 MG 7:30 AM, & 4:30 PM 08/27 1630 AC 08/28
 
  IV   0634
 
Insulin Aspart 0 TIDAC 08/24 0800 AC 08/24
 
  SC   1646
 
Lisinopril 20 MG DAILY 08/24 0900 AC 08/28
 
  PO   1123
 
Magnesium Chloride 64 MG BID 08/25 0914 AC 08/28
 
  PO   1123
 
Melatonin 10 MG AT BEDTIME 08/27 2100 AC 08/27
 
  PO   2040
 
Oxycodone/ 1 TAB Q4P PRN 08/24 0930 AC 08/28
 
Acetaminophen  PO   1123
 
Potassium Chloride 40 MEQ ONCE ONE 08/28 1228 DC 
 
  PO 08/28 1229  
 
Potassium Chloride 40 MEQ BID 08/25 2100 AC 08/28
 
  PO   1122
 
Senna 187 MG AT BEDTIME 08/25 2100 AC 08/27
 
  PO   2040
 
 
 
 
Results
Last 48 Hrs of Labs/Mics:
 Laboratory Tests
 
08/28/18 0645:
Anion Gap 7, Estimated GFR > 60, BUN/Creatinine Ratio 18.0, Magnesium 1.8
 
08/27/18 0655:
Anion Gap 9, Estimated GFR > 60, BUN/Creatinine Ratio 20.0, Magnesium 1.7, Total
Bilirubin 1.5  H, Direct Bilirubin 0.8  H, AST 19, ALT 23, Alkaline Phosphatase 
93, Total Protein 6.4, Albumin 3.4  L
 
Recent Imaging Studies:
Chest x-ray:
The small right pleural effusion has decreased after recent thoracentesis. No
evidence of pneumothorax.
 
Assessment/Plan
Assessment/Plan
Assessment:
1.  Worsening dyspnea with increasing right pleural effusion
2.  Known coronary disease, status post 2 drug-eluting stents in 2016
3.  Ischemic cardiomyopathy with ejection fraction 30-35%
4.  Abdominal discomfort with elevated bilirubin
5.  Hypokalemia
6.  Diabetes
 
Plan:
* Would increase Lasix to 80 mg IV every 8 hours to improve diuresis
* Monitor input and output with daily weights
* Replete the electrolytes as needed
* Check basic metabolic profile daily
* Continue other cardiac medications
Continue telemetry? Yes

## 2018-08-29 VITALS — SYSTOLIC BLOOD PRESSURE: 130 MMHG | DIASTOLIC BLOOD PRESSURE: 80 MMHG

## 2018-08-29 VITALS — DIASTOLIC BLOOD PRESSURE: 70 MMHG | SYSTOLIC BLOOD PRESSURE: 118 MMHG

## 2018-08-29 VITALS — SYSTOLIC BLOOD PRESSURE: 120 MMHG | DIASTOLIC BLOOD PRESSURE: 74 MMHG

## 2018-08-29 NOTE — DISCHARGE SUMMARY
Visit Information
 
Visit Dates
Admission Date:
08/23/18
 
Discharge Date:
08/31/18
 
 
Hospital Course
 
Course
Attending Physician:
Sayda Chung MD
 
Primary Care Physician:
[facility]
53-year-old male with PMH CAD, HF ReF, right-sided pleural effusion, type II DM,
HLD, HTN, presented with chief complaints of shortness of breath and abdominal 
pain since last 1 week.
 
According to the patient he was relatively all right around a week ago.  He was 
compliant with his medication, he is not compliant with his salt intake and the 
fluid intake.  Since last 1 week he is having difficulty in the breathing 
especially when he lies flat.  Because of that he get the panic attacks, 
[place] difficulty in breathing, palpitation, tachycardia.  He was also 
complaining of episodes of cough which led to vomiting since last 3 or 4 days.  
Because of the cough and vomiting he started having pain in his abdomen since 1-
2 days.  The pain is located in the upper part of the abdomen nonradiating 
burning in nature, denies for any radiation, in association with the food.
Of note he stopped smoking since February 2018, denies for alcohol use.
 
Past medical history -
CAD w/ MI X2 s/p PCI with drug-eluting stent placement (2009, 2014), most recent
cardiac cath in 11/4/2016 with Dr. Adalberto Esqueda (2 drug-eluting stents to the 
RCA on 2 to the LAD both for 80% stenosis)
HFrEF (LVEF 25-30% on 11/29/16); right-sided pleural effusion(paracentesis 
November 2016)
Type 2 diabetes
Hyperlipidemia
Hypertension
History of Guillain-Dow syndrome
Migraines
 
Vital signs on admission-temperature 97.4, pulse 110, respiratory 18, blood 
pressure 165/98, SPO2 96% on room air.
 
Plan:
Large right-sided pleural effusion -
* Discussed with Dr. Castillo, performed a two-step thoracentesis.  On 8/24/2018 
and following up on 08/28/2018, clearing 1.5 L and 1.6 L of fluid, respectively
* TRC/Neb
 
Acute on chronic HFrEF 25% 
* We admitted the patient to telemetry floor for observation and management of 
vitals
* Cardiology consulted and determined dyspnea likely related to inc. right 
pleural effusion, would benefit from thoracentesis
* IV Lasix 80 mg q8h -> PO Lasix with outpatient continuing dose
* Strict intake output charting
* Daily weight measurement
* Low-salt diet
 
Abdominal pain probably muscular or due to mesenteric edema -
CT scan did not show any evidence of muscle injury.
* Voltaren gel locally
 
Hypokalemia - 3.1
* He was already given potassium 40 mEq in the ED.  
* Repleted Potassium with K Dur 40 mEq TID
 
Type 2 DM -
* Finger Stick TID/HS
* Novalog according to sliding scale
Allergies:
Coded Allergies:
Penicillins (PER PT WAS TOLD AS A KID 06/22/16)
tetanus toxoid, adsorbed (ARM WENT STIFF, COULDNT MOVE FOR A WEEK 08/09/16)
 
Significant Procedures:
CXR - 08/28
The small right pleural effusion has decreased after recent thoracentesis. No
evidence of pneumothorax.
Pertinent Lab Results:
Blood workup on admission -hemoglobin 14.0, hematocrit 43.7, platelet count 238,
granulocyte 84.9, sodium 141, potassium 3.1, chloride 102, carbon dioxide 31, 
anion gap 9, BUN 11, creatinine 0.8, glucose 84, lactic acid 1.2, calcium 9.3, 
total bilirubin 2.9, AST 22, ALT 21, alkaline phosphatase 109, troponin I 0.04, 
proBNP 15,600, albumin 4.5, lipase 21, amylase 39.
 
EKG-normal sinus rhythm, LVH, possible left bundle branch block.
 
Disposition Summary
 
Disposition
Principal Diagnosis:
Pleural effusion, right sided
Additional Diagnosis:
Acute on chronic HFeEF 
Hypokalemia
Discharge Disposition: home or self care
 
Discharge Instructions
 
General Discharge Information
Code Status: Full Code
Patient's Diet:
Regular
Patient's Activity:
Ad carl
Follow-Up Instructions/Appts:
Please follow up with your PCP within one week.
Please follow up with Cardiologist within one week.
Please continue to take your home medications as required.
 
Medications at Discharge
Discharge Medications:
Stop taking the following medications:
Furosemide (Lasix) 20 MG TABLET ORAL TWICE DAILY Qty = 90
 
Continue taking these medications:
Metformin HCl (Glucophage) 1,000 MG TABLET
    1 Tablet ORAL TWICE DAILY
    Qty = 60
    Comments:
       NOT GIVEN
 
Finasteride (Proscar) 5 MG TABLET
    1 Tablet ORAL DAILY
    Qty = 30
    Comments:
       Last Taken: 8/31/18
             Time: 9:00 AM
 
Atorvastatin Calcium (Atorvastatin Calcium) 40 MG TABLET
    1 Tablet ORAL DAILY
    Qty = 30
    Comments:
       Last Taken: 8/31/18
             Time: 9:00 AM
 
Carvedilol (Carvedilol) 3.125 MG TABLET
    1 Tablet ORAL TWICE DAILY
    Qty = 30
    Comments:
       Last Taken: 8/31/18
             Time: 9:00 AM
 
Lisinopril (Lisinopril) 20 MG TABLET
    20 Milligram ORAL DAILY
    Days = 30
    Comments:
       Last Taken: 8/31/18
             Time: 9:00 AM
 
Aspirin (Aspirin*) 81 MG TAB.CHEW
    1 Tablet ORAL DAILY
    Comments:
       Last Taken: 8/31/18
             Time: 9:00 AM
 
Potassium Chloride (Klor-Con M20) 20 MEQ TAB.ER.PRT
    40 Millequivalent ORAL DAILY
    Qty = 30
    Instructions:
       .
    Comments:
       Last Taken: 8/31/18
             Time: 2:20 PM
 
Start taking the following new medications:
Furosemide (Lasix) 40 MG TABLET
    1.5 Tablet ORAL TWICE DAILY
    Qty = 90
    No Refills
    Instructions:
       take 1.5 tabs (60 mg) BID
    Comments:
       Last Taken: 8/31/18
             Time: 9:00 AM
       IV DOSE GIVEN
 
 
Copies To:
Zeenat Galaviz DO
 
Attending MD Review Statement
Documenting Attending:
Sancho Wallace MD

## 2018-08-29 NOTE — PN- CARDIOLOGY
Subjective
Subjective:
The patient complains of difficulty sleeping.  Shortness of breath is somewhat 
better.  No chest pain.  No palpitations.  No diaphoresis.  He has been 
diuresing well on the IV Lasix, which was increased to 3 times per day.
 
Objective
Vital Signs and I&Os
Vital Signs
 
 
Date Time Temp Pulse Resp B/P B/P Pulse O2 O2 Flow FiO2
 
     Mean Ox Delivery Rate 
 
08/29 1402 97.9 91 18 118/70  97   
 
08/29 0826  110  128/90     
 
08/29 0826  110  128/90     
 
08/29 0620 98.0 110 18 130/80  95   
 
08/28 2202 97.6 103 19 110/64  95   
 
08/28 1600       Room Air Room Air 
 
08/28 1459 98.2 109 20 110/60  96 Room Air  
 
 
 Intake & Output
 
 
 08/29 1600 08/29 0800 08/29 0000 08/28 1600 08/28 0800 08/28 0000
 
Intake Total  150 264 960 30 440
 
Output Total 0913 957 7185   700
 
Balance -1200 -525 -1611 960 30 -260
 
       
 
Intake, IV   14 0 30 
 
Intake, Oral  150 250 960  440
 
Number    1  
 
Bowel      
 
Movements      
 
Output, Urine 5718 286 9605   700
 
Patient   197 lb 200 lb  200 lb
 
Weight      
 
Weight      Bed scale
 
Measurement      
 
Method      
 
 
 
Physical Exam:
Gen: NAD
HEENT: normal
Lungs: Bilateral rales, normal resp. effort
Heart: RRR, S1, S2, 1/6 systolic murmur
Abdomen: Soft, nontender, no masses
Extremities: 2+ edema bilaterally
Neuro: Alert and oriented x 3, cranial nerves intact
Current Medications:
 Current Medications
 
 
  Sig/John Start time  Last
 
Medication Dose Route Stop Time Status Admin
 
Aspirin 81 MG DAILY 08/24 0900 AC 08/29
 
  PO   0826
 
Atorvastatin Calcium 40 MG DAILY 08/24 0900 AC 08/29
 
  PO   0826
 
Carvedilol 3.125 MG BID 08/23 2100 AC 08/29
 
  PO   0826
 
Diclofenac Sodium 1 SALVATORE 4 TIMES/DAY 08/23 1737 AC 08/28
 
  TOP   2007
 
Finasteride 5 MG DAILY 08/23 1559 AC 08/29
 
  PO   0823
 
Furosemide 80 MG TID 08/28 1400 AC 08/29
 
  IV   0826
 
Insulin Aspart 0 TIDAC 08/24 0800 AC 08/24
 
  SC   1646
 
Lisinopril 20 MG DAILY 08/24 0900 AC 08/29
 
  PO   0826
 
Magnesium Chloride 64 MG BID 08/25 0914 AC 08/29
 
  PO   0823
 
Melatonin 10 MG AT BEDTIME 08/27 2100 AC 08/28
 
  PO   2012
 
Omeprazole 40 MG ONCE ONE 08/29 1200 DC 08/29
 
  PO 08/29 1201  1212
 
Oxycodone/ 1 TAB Q4P PRN 08/24 0930 AC 08/29
 
Acetaminophen  PO   1212
 
Potassium Chloride 40 MEQ TID 08/29 1400 AC 
 
  PO   
 
Potassium Chloride 40 MEQ DAILY 08/29 0930 DC 
 
  PO   
 
Potassium Chloride 0 .STK-MED ONE 08/28 1442 DC 
 
  PO   
 
Potassium Chloride 40 MEQ ONCE ONE 08/28 1430 DC 08/28
 
  PO 08/28 1431  1443
 
Potassium Chloride 40 MEQ BID 08/25 2100 DC 08/29
 
  PO   0826
 
Ramelteon 8 MG AT BEDTIME PRN 08/29 1038 AC 
 
  PO   
 
Ramelteon 8 MG AT BEDTIME 08/28 2100 DC 08/28
 
  PO   2010
 
Ramelteon 0 .STK-MED ONE 08/28 1558 DC 
 
  PO   
 
Senna 187 MG AT BEDTIME 08/25 2100 AC 08/28
 
  PO   2012
 
Trazodone HCl 50 MG AT BEDTIME 08/29 2100 AC 
 
  PO   
 
 
 
 
Results
Last 48 Hrs of Labs/Mics:
 Laboratory Tests
 
08/29/18 0611:
Anion Gap 5, Estimated GFR > 60, BUN/Creatinine Ratio 22.2, Magnesium 1.7, Total
Bilirubin 1.3, Direct Bilirubin 0.8  H, AST 22, ALT 23, Alkaline Phosphatase 85,
Total Protein 5.9  L, Albumin 3.1  L
 
08/28/18 0645:
Anion Gap 7, Estimated GFR > 60, BUN/Creatinine Ratio 18.0, Magnesium 1.8
 
 
Assessment/Plan
Assessment/Plan
Assessment:
1.  Worsening dyspnea with increasing right pleural effusion
2.  Known coronary disease, status post 2 drug-eluting stents in 2016
3.  Ischemic cardiomyopathy with ejection fraction 30-35%
4.  Abdominal discomfort with elevated bilirubin
5.  Hypokalemia
6.  Diabetes
 
Plan:
* Continue IV Lasix, 80 mg 3 times per day
* Monitor input and output with daily weights
* Supplement potassium to greater than 4.0
* Check basic metabolic profile daily
* Continue other cardiac medications
Continue telemetry? Yes

## 2018-08-29 NOTE — PN- HOUSESTAFF
Tiara Kaminski 18 0727:
Subjective
Follow-up For:
Acute systolic heart failure leading to volume
Subjective:
Afebrile overnight. Patient is seen and examined in bed this morning. Patient 
states he had a couple hours of sleep last night. Patient states his abdominal 
pain has improved from 9 out of 10 yesterday to a 7 out of 10 today. Patient 
states his abdominal pain mostly runs across his mid abdomen, but is mostly 
right-sided. Patient had a repeat thoracentesis yesterday. Patient otherwise 
denies any chest pain, palpitations, fevers, chills, and fatigue.
 
Review of Systems
Constitutional:
Reports: see HPI, chills. 
 
Objective
Last 24 Hrs of Vital Signs/I&O
 Vital Signs
 
 
Date Time Temp Pulse Resp B/P B/P Pulse O2 O2 Flow FiO2
 
     Mean Ox Delivery Rate 
 
 0620 98.0 110 18 130/80  95   
 
 2202 97.6 103 19 110/64  95   
 
 1600       Room Air Room Air 
 
 1459 98.2 109 20 110/60  96 Room Air  
 
 
 Intake & Output
 
 
  0800  0000  1600
 
Intake Total 150 264 960
 
Output Total 675 1875 
 
Balance -525 -1611 960
 
    
 
Intake, IV  14 0
 
Intake, Oral 150 250 960
 
Number   1
 
Bowel   
 
Movements   
 
Output, Urine 675 1875 
 
Patient  197 lb 200 lb
 
Weight   
 
 
 
 
Physical Exam
General Appearance: Alert, Oriented X3, Cooperative, No Acute Distress
Skin: No Rashes, No Breakdown
HEENT: Atraumatic
Neck: Supple, No JVD
Cardiovascular: Regular Rate, Normal S1, Normal S2
Lungs: Clear to Auscultation
Abdomen: abdominal tenderness, right-sided mostly
Neurological: Normal Speech
Extremities: 2+ edema in b/l lower extremities
 
Assessment/Plan
Assessment:
XRY-CHEST XRAY, SINGLE VIEW  -
The small right pleural effusion has decreased after recent thoracentesis. No
evidence of pneumothorax.
 
53-year-old male with PMH CAD, HF ReF, right-sided pleural effusion, type II DM,
HLD, HTN, presented with chief complaints of shortness of breath and abdominal 
pain since last 1 week.
 
Problems
Right-sided pleural effusion post thoracentesis on Friday
Bilateral pedal edema
HFR EF 25%
 
#Right-sided pleural effusion, s/p thoracentesis, repeated 
-Repeat chest x-ray: small right pleural effusion has decreased after recent 
thoracentesis. No
evidence of pneumothorax.
-Increased Lasix to 80 mg IV q8 to improve diuresis
-Thoracentesis performed  (1.5 L) ->  (1.6 L) produced with ultrasound
-guided thoracentesis
-Manage electrolytes as needed; Magnesium followed with normal values noted
-Follow-up BEP tomorrow
-If abdominal pain persists, consult GI. LFTs are normal
-Daily weight measurement
-Pt. had repeat thoracentesis with IR 
-Pt. counseled about low sodium diet as well as fluid restriction
-Repeat CXR tmrw 
-Strict I/O charting
 
 
FC
Heart healthy diet with sodium restriction
DVT prophylaxis
Problem List:
 1. HFrEF (heart failure with reduced ejection fraction)
 
 2. Pleural effusion
 
Pain Ratin
Pain Location:
abdominal pain
Pain Goal: Pain 7 or less
Pain Plan:
prn pain meds
Tomorrow's Labs & Rationales:
routine
 
 
Sancho Wallace 18 1249:
Attending MD Review Statement
 
Attending Statement
Attending MD Statement: examined this patient, discuss w/resident/PA/NP, agreed 
w/resident/PA/NP, reviewed EMR data (avail), discussed with nursing, discussed 
with case mgmt
Attending Assessment/Plan:
Acute on chronic systolic chf with EF of 20%. d/w cardiology and pt the care 
plan. cardiology recommending increasing the lasix dose to 80mg iv tid on . 
Pt cousnelled about low sodium diet as well as fluid restriction.  dietary 
consult placed. Pt cousnelled about low sodium diet . 
 
Rt side pleural effusion s/p thoracentesis- 1.5 L fluid removed earlier this 
admission on . Repeat cxr showing small rt side pleural effusion and pt had 
repeat thoracentesis by IR on  and had 1.6 L removed. Will repeat CXR 
tomorrow am. 
 
ABdominal pain - pt says it happens after episodes of retching and vomiting. If 
persists will get GI consult. His LFTs are normal except bili being slightly 
high but is trending down. COuld have some liver disease secondary to chf.
 
kaley pt the care plan.

## 2018-08-30 VITALS — DIASTOLIC BLOOD PRESSURE: 54 MMHG | SYSTOLIC BLOOD PRESSURE: 110 MMHG

## 2018-08-30 VITALS — DIASTOLIC BLOOD PRESSURE: 52 MMHG | SYSTOLIC BLOOD PRESSURE: 108 MMHG

## 2018-08-30 VITALS — SYSTOLIC BLOOD PRESSURE: 110 MMHG | DIASTOLIC BLOOD PRESSURE: 58 MMHG

## 2018-08-30 LAB
ABSOLUTE GRANULOCYTE CT: 4.7 /CUMM (ref 1.4–6.5)
BASOPHILS # BLD: 0 /CUMM (ref 0–0.2)
BASOPHILS NFR BLD: 0.2 % (ref 0–2)
EOSINOPHIL # BLD: 0.3 /CUMM (ref 0–0.7)
EOSINOPHIL NFR BLD: 4.4 % (ref 0–5)
ERYTHROCYTE [DISTWIDTH] IN BLOOD BY AUTOMATED COUNT: 21.5 % (ref 11.5–14.5)
GRANULOCYTES NFR BLD: 73.9 % (ref 42.2–75.2)
HCT VFR BLD CALC: 37.9 % (ref 42–52)
LYMPHOCYTES # BLD: 0.9 /CUMM (ref 1.2–3.4)
MCH RBC QN AUTO: 26.2 PG (ref 27–31)
MCHC RBC AUTO-ENTMCNC: 32.1 G/DL (ref 33–37)
MCV RBC AUTO: 81.5 FL (ref 80–94)
MONOCYTES # BLD: 0.4 /CUMM (ref 0.1–0.6)
PLATELET # BLD: 184 /CUMM (ref 130–400)
PMV BLD AUTO: 8.7 FL (ref 7.4–10.4)
RED BLOOD CELL CT: 4.65 /CUMM (ref 4.7–6.1)
WBC # BLD AUTO: 6.3 /CUMM (ref 4.8–10.8)

## 2018-08-30 NOTE — RADIOLOGY REPORT
EXAMINATION:
XR CHEST
 
CLINICAL INFORMATION:
Pleural effusion. Shortness of breath.
 
COMPARISON:
Chest x-rays 8/28/2018 and 8/27/2018.
 
TECHNIQUE:
Frontal and lateral views of the chest were obtained.
 
FINDINGS:
There is stable cardiomegaly compared to the prior study. Mild prominence of
the central pulmonary vasculature appears decreased compared to the prior
study. The right basilar pleural effusion has moderately decreased compared
to the prior study, and trace pleural effusion is demonstrated on the current
study. There is no pneumothorax. Linear opacities at the bases may be
consistent with interstitial changes.
 
IMPRESSION:
1. The right pleural effusion is further decreased in size, with trace
effusion noted on the current study. There is no pneumothorax.
 
2. There is stable cardiomegaly. The central pulmonary vasculature is less
prominent.

## 2018-08-30 NOTE — PN- CARDIOLOGY
Subjective
Subjective:
  The patient reports that he is feeling somewhat better.  Shortness of breath 
is improving.  No chest pain.  No palpitations.  He is diuresing well on his 
current dose of Lasix.
 
Objective
Vital Signs and I&Os
Vital Signs
 
 
Date Time Temp Pulse Resp B/P B/P Pulse O2 O2 Flow FiO2
 
     Mean Ox Delivery Rate 
 
08/30 1436 97.8 95 20 108/52  95 Room Air  
 
08/30 0809  85  120/82     
 
08/30 0808  85  120/82     
 
08/30 0647 98.1 85 18 110/58  96 Room Air  
 
08/29 2357       Room Air  
 
08/29 2147 97.8 82 12 120/74  96 Room Air  
 
08/29 2108  90  132/70     
 
 
 Intake & Output
 
 
 08/30 1600 08/30 0800 08/30 0000 08/29 1600 08/29 0800 08/29 0000
 
Intake Total 460 480 350 500 150 264
 
Output Total   2350 3027 203 8582
 
Balance 460 480 -2000 -700 -525 -1611
 
       
 
Intake, IV      14
 
Intake, Oral 460 480 350 500 150 250
 
Number  0  2  
 
Bowel      
 
Movements      
 
Output, Urine   2350 7438 984 1363
 
Patient      197 lb
 
Weight      
 
 
 
Physical Exam:
Gen: NAD
HEENT: normal
Lungs: Bilateral rales, normal resp. effort
Heart: RRR, S1, S2, 1/6 systolic murmur
Abdomen: Soft, nontender, no masses
Extremities: 2+ edema bilaterally
Neuro: Alert and oriented x 3, cranial nerves intact
Current Medications:
 Current Medications
 
 
  Sig/John Start time  Last
 
Medication Dose Route Stop Time Status Admin
 
Aspirin 81 MG DAILY 08/24 0900 AC 08/30
 
  PO   0808
 
Atorvastatin Calcium 40 MG DAILY 08/24 0900 AC 08/30
 
  PO   0808
 
Carvedilol 3.125 MG BID 08/23 2100 AC 08/30
 
  PO   0808
 
Diclofenac Sodium 1 SALVATORE 4 TIMES/DAY 08/23 1737 AC 08/28
 
  TOP   2007
 
Finasteride 5 MG DAILY 08/23 1559 AC 08/30
 
  PO   0808
 
Furosemide 80 MG BID 08/30 2100 CAN 
 
  PO   
 
Furosemide 80 MG TID 08/30 2100 AC 
 
  IV   
 
Furosemide 80 MG TID 08/28 1400 DC 08/30
 
  IV   1430
 
Insulin Aspart 0 TIDAC 08/24 0800 AC 08/24
 
  SC   1646
 
Lisinopril 20 MG DAILY 08/24 0900 AC 08/30
 
  PO   0809
 
Magnesium Chloride 64 MG BID 08/25 0914 AC 08/30
 
  PO   0807
 
Melatonin 10 MG AT BEDTIME 08/27 2100 AC 08/29
 
  PO   2109
 
Oxycodone/ 1 TAB Q4P PRN 08/24 0930 AC 08/30
 
Acetaminophen  PO   1744
 
Potassium Chloride 40 MEQ TID 08/29 1400 AC 08/30
 
  PO   1430
 
Ramelteon 8 MG AT BEDTIME PRN 08/29 1038 AC 
 
  PO   
 
Senna 187 MG AT BEDTIME 08/25 2100 AC 08/29
 
  PO   2108
 
Trazodone HCl 50 MG AT BEDTIME 08/29 2100 AC 08/29
 
  PO   2109
 
 
 
 
Results
Last 48 Hrs of Labs/Mics:
 Laboratory Tests
 
08/30/18 0647:
Anion Gap 7, Estimated GFR > 60, BUN/Creatinine Ratio 20.0, Magnesium 1.8, CBC w
Diff NO MAN DIFF REQ, RBC 4.65  L, MCV 81.5, MCH 26.2  L, MCHC 32.1  L, RDW 21.5
 H, MPV 8.7, Gran % 73.9, Lymphocytes % 14.9  L, Monocytes % 6.6, Eosinophils % 
4.4, Basophils % 0.2, Absolute Granulocytes 4.7, Absolute Lymphocytes 0.9  L, 
Absolute Monocytes 0.4, Absolute Eosinophils 0.3, Absolute Basophils 0
 
08/29/18 0611:
Anion Gap 5, Estimated GFR > 60, BUN/Creatinine Ratio 22.2, Magnesium 1.7, Total
Bilirubin 1.3, Direct Bilirubin 0.8  H, AST 22, ALT 23, Alkaline Phosphatase 85,
Total Protein 5.9  L, Albumin 3.1  L
 
Recent Imaging Studies:
  Chest x-ray:
1. The right pleural effusion is further decreased in size, with trace
effusion noted on the current study. There is no pneumothorax.
 
2. There is stable cardiomegaly. The central pulmonary vasculature is less
prominent.
 
Assessment/Plan
Assessment/Plan
Assessment:
1.  Worsening dyspnea with increasing right pleural effusion
2.  Known coronary disease, status post 2 drug-eluting stents in 2016
3.  Ischemic cardiomyopathy with ejection fraction 30-35%
4.  Abdominal discomfort with elevated bilirubin
5.  Hypokalemia
6.  Diabetes
 
Plan:
* Continue IV Lasix, 80 mg 3 times per day
* Monitor input and output with daily weights
* Supplement potassium to greater than 4.0
* Possible discharge tomorrow on p.o. Lasix.
 
Continue telemetry? Yes

## 2018-08-31 VITALS — SYSTOLIC BLOOD PRESSURE: 132 MMHG | DIASTOLIC BLOOD PRESSURE: 64 MMHG

## 2018-08-31 VITALS — DIASTOLIC BLOOD PRESSURE: 82 MMHG | SYSTOLIC BLOOD PRESSURE: 120 MMHG

## 2018-08-31 LAB
ABSOLUTE GRANULOCYTE CT: 4.6 /CUMM (ref 1.4–6.5)
BASOPHILS # BLD: 0 /CUMM (ref 0–0.2)
BASOPHILS NFR BLD: 0.6 % (ref 0–2)
EOSINOPHIL # BLD: 0.3 /CUMM (ref 0–0.7)
EOSINOPHIL NFR BLD: 5.1 % (ref 0–5)
ERYTHROCYTE [DISTWIDTH] IN BLOOD BY AUTOMATED COUNT: 21.4 % (ref 11.5–14.5)
GRANULOCYTES NFR BLD: 72.6 % (ref 42.2–75.2)
HCT VFR BLD CALC: 38 % (ref 42–52)
LYMPHOCYTES # BLD: 0.9 /CUMM (ref 1.2–3.4)
MCH RBC QN AUTO: 26.4 PG (ref 27–31)
MCHC RBC AUTO-ENTMCNC: 32.4 G/DL (ref 33–37)
MCV RBC AUTO: 81.7 FL (ref 80–94)
MONOCYTES # BLD: 0.5 /CUMM (ref 0.1–0.6)
PLATELET # BLD: 182 /CUMM (ref 130–400)
PMV BLD AUTO: 8.5 FL (ref 7.4–10.4)
RED BLOOD CELL CT: 4.66 /CUMM (ref 4.7–6.1)
WBC # BLD AUTO: 6.4 /CUMM (ref 4.8–10.8)

## 2018-08-31 NOTE — PN- CARDIOLOGY
Subjective
Subjective:
Doing okay.  Respiratory status improved.  Ambulating without difficulty.
 
Objective
Vital Signs and I&Os
Vital Signs
 
 
Date Time Temp Pulse Resp B/P B/P Pulse O2 O2 Flow FiO2
 
     Mean Ox Delivery Rate 
 
08/31 0901  92  140/70     
 
08/31 0901  92  140/70     
 
08/31 0800       Room Air Room Air 
 
08/31 0636 97.9 76 20 132/64  97 Room Air  
 
08/30 2205 97.8 83 18 110/54  96 Room Air  
 
08/30 2050  90  110/60     
 
08/30 1436 97.8 95 20 108/52  95 Room Air  
 
 
 Intake & Output
 
 
 08/31 1600 08/31 0800 08/31 0000 08/30 1600 08/30 0800 08/30 0000
 
Intake Total  120 120 460 480 350
 
Output Total  600 600   2350
 
Balance  -480 -480 460 480 -2000
 
       
 
Intake, Oral  120 120 460 480 350
 
Number     0 
 
Bowel      
 
Movements      
 
Output, Urine  600 600   2350
 
Patient   188 lb   
 
Weight      
 
Weight   Bed scale   
 
Measurement      
 
Method      
 
 
 
Physical Exam:
Gen: NAD
HEENT: normal
Lungs: Bilateral rales, normal resp. effort
Heart: RRR, S1, S2, 1/6 systolic murmur
Abdomen: Soft, nontender, no masses
Extremities: 2+ edema bilaterally
Neuro: Alert and oriented x 3, cranial nerves intact
Current Medications:
 Current Medications
 
 
  Sig/John Start time  Last
 
Medication Dose Route Stop Time Status Admin
 
Aspirin 81 MG DAILY 08/24 0900 AC 08/31
 
  PO   0859
 
Atorvastatin Calcium 40 MG DAILY 08/24 0900 AC 08/31
 
  PO   0859
 
Carvedilol 3.125 MG BID 08/23 2100 AC 08/31
 
  PO   0901
 
Diclofenac Sodium 1 SALVATORE 4 TIMES/DAY 08/23 1737 AC 08/28
 
  TOP   2007
 
Finasteride 5 MG DAILY 08/23 1559 AC 08/31
 
  PO   0900
 
Furosemide 80 MG BID 08/30 2100 CAN 
 
  PO   
 
Furosemide 80 MG TID 08/30 2100 AC 08/31
 
  IV   0901
 
Furosemide 80 MG TID 08/28 1400 DC 08/30
 
  IV   1430
 
Insulin Aspart 0 TIDAC 08/24 0800 AC 08/24
 
  SC   1646
 
Lisinopril 20 MG DAILY 08/24 0900 AC 08/31
 
  PO   0901
 
Magnesium Chloride 64 MG BID 08/25 0914 AC 08/31
 
  PO   0859
 
Melatonin 10 MG AT BEDTIME 08/27 2100 AC 08/30
 
  PO   2052
 
Oxycodone/ 1 TAB Q4P PRN 08/24 0930 DC 08/31
 
Acetaminophen  PO   0657
 
Potassium Chloride 10 MEQ Q1H 08/31 0930 DC 
 
  IV 08/31 1031  
 
Potassium Chloride 40 MEQ TID 08/29 1400 AC 08/31
 
  PO   0859
 
Ramelteon 8 MG AT BEDTIME PRN 08/29 1038 AC 
 
  PO   
 
Senna 187 MG AT BEDTIME 08/25 2100 AC 08/30
 
  PO   2050
 
Trazodone HCl 50 MG AT BEDTIME 08/29 2100 AC 08/30
 
  PO   2056
 
 
 
 
Results
Last 48 Hrs of Labs/Mics:
 Laboratory Tests
 
08/31/18 0650:
Anion Gap 9, Estimated GFR > 60, BUN/Creatinine Ratio 18.9, CBC w Diff NO MAN 
DIFF REQ, RBC 4.66  L, MCV 81.7, MCH 26.4  L, MCHC 32.4  L, RDW 21.4  H, MPV 8.5
, Gran % 72.6, Lymphocytes % 14.6  L, Monocytes % 7.1, Eosinophils % 5.1  H, 
Basophils % 0.6, Absolute Granulocytes 4.6, Absolute Lymphocytes 0.9  L, 
Absolute Monocytes 0.5, Absolute Eosinophils 0.3, Absolute Basophils 0
 
08/30/18 0647:
Anion Gap 7, Estimated GFR > 60, BUN/Creatinine Ratio 20.0, Magnesium 1.8, CBC w
Diff NO MAN DIFF REQ, RBC 4.65  L, MCV 81.5, MCH 26.2  L, MCHC 32.1  L, RDW 21.5
 H, MPV 8.7, Gran % 73.9, Lymphocytes % 14.9  L, Monocytes % 6.6, Eosinophils % 
4.4, Basophils % 0.2, Absolute Granulocytes 4.7, Absolute Lymphocytes 0.9  L, 
Absolute Monocytes 0.4, Absolute Eosinophils 0.3, Absolute Basophils 0
 
 
Assessment/Plan
Assessment/Plan
Assessment:
1.  Worsening dyspnea with increasing right pleural effusion
2.  Known coronary disease, status post 2 drug-eluting stents in 2016
3.  Ischemic cardiomyopathy with ejection fraction 30-35%
4.  Abdominal discomfort with elevated bilirubin
5.  Hypokalemia
6.  Diabetes
 
Plan:
* Transition to oral Lasix today; discharge planning as per the medical team
* Monitor input and output with daily weights
* Supplement potassium to greater than 4.0
* Consideration for discharge today if stable.  Apparently, the patient was 
turned down for the CHF clinic due to lack of insurance.
Continue telemetry? No

## 2018-08-31 NOTE — PN- HOUSESTAFF
Tiara Kaminski 18 0753:
Subjective
Follow-up For:
Acute systolic heart failure leading to volume
Subjective:
Afebrile overnight. Patient is seen and examined in bed sitting up this morning.
Patient states this morning his leg swelling has decreased, mostly his left leg 
more than the right leg. Patient though still complains of some tenderess in 
both legs. Patient otherwise denies any chest pain or shortness of breath 
overnight. Patient states he got atleast 6 hours of sleep last night. Patient 
further denies any other complaints today and looking forward to going home.
 
Review of Systems
Constitutional:
Reports: no symptoms, see HPI. 
 
Objective
Last 24 Hrs of Vital Signs/I&O
 Vital Signs
 
 
Date Time Temp Pulse Resp B/P B/P Pulse O2 O2 Flow FiO2
 
     Mean Ox Delivery Rate 
 
 0636 97.9 76 20 132/64  97 Room Air  
 
 2205 97.8 83 18 110/54  96 Room Air  
 
 2050  90  110/60     
 
 1436 97.8 95 20 108/52  95 Room Air  
 
 0809  85  120/82     
 
 0808  85  120/82     
 
 
 Intake & Output
 
 
  0800  0000  1600
 
Intake Total 120 120 460
 
Output Total 600 600 
 
Balance -480 -480 460
 
    
 
Intake, Oral 120 120 460
 
Output, Urine 600 600 
 
Patient  188 lb 
 
Weight   
 
Weight  Bed scale 
 
Measurement   
 
Method   
 
 
 
 
Physical Exam
General Appearance: Alert, Oriented X3, Cooperative, No Acute Distress
Skin: No Rashes, No Breakdown
HEENT: Atraumatic
Neck: Supple, No JVD
Cardiovascular: Regular Rate, Normal S1, Normal S2
Lungs: Clear to Auscultation
Abdomen: Soft, mild tenderness in mid abdomen region
Neurological: Normal Speech
Extremities: 2+ edema in b/l lower extremities
 
Assessment/Plan
Assessment:
CXR  -
1. The right pleural effusion is further decreased in size, with trace
effusion noted on the current study. There is no pneumothorax.
2. There is stable cardiomegaly. The central pulmonary vasculature is less
prominent.
 
53-year-old male with PMH CAD, HF ReF, right-sided pleural effusion, type II DM,
HLD, HTN, presented with chief complaints of shortness of breath and abdominal 
pain since last 1 week.
 
Problems
Right-sided pleural effusion post thoracentesis on Friday
Bilateral pedal edema
HFR EF 25%
 
#Right-sided pleural effusion, s/p thoracentesis, repeated 
-Repeat chest x-ray: small right pleural effusion has decreased after recent 
thoracentesis. No
evidence of pneumothorax.
-Increased Lasix to 80 mg IV q8 to improve diuresis
-Thoracentesis performed  (1.5 L) ->  (1.6 L) produced with ultrasound
-guided thoracentesis
-Manage electrolytes as needed; Magnesium followed with normal values noted
-Follow-up BEP tomorrow
-If abdominal pain persists, consult GI. LFTs are normal
-Daily weight measurement
-Pt. had repeat thoracentesis with IR 
-Pt. counseled about low sodium diet as well as fluid restriction
-Repeat CXR tmrw 
-Strict I/O charting
 
FC
Heart healthy diet with sodium restriction
DVT prophylaxis
 
Problem List:
 1. Pleural effusion
 
 2. HFrEF (heart failure with reduced ejection fraction)
 
Pain Ratin
Pain Location:
na
Pain Goal: Remain pain free
Pain Plan:
na
Tomorrow's Labs & Rationales:
routine
 
 
Sancho Wallace 18 1115:
Attending MD Review Statement
 
Attending Statement
Attending MD Statement: examined this patient, discuss w/resident/PA/NP, agreed 
w/resident/PA/NP, reviewed EMR data (avail), discussed with nursing, discussed 
with case mgmt
Attending Assessment/Plan:
Acute on chronic systolic chf with EF of 20%. d/w cardiology and pt the care 
plan. cardiology recommending increasing the lasix dose to 80mg iv tid on . 
Pt cousnelled about low sodium diet as well as fluid restriction.  dietary 
consult placed. Pt cousnelled about low sodium diet .Plan is to dc him today on 
60mg po bid - d/w Dr Stephens the care plan.  
 
Rt side pleural effusion s/p thoracentesis- 1.5 L fluid removed earlier this 
admission on . Repeat cxr showing small rt side pleural effusion and pt had 
repeat thoracentesis by IR on  and had 1.6 L removed. Repeat CXR done does 
not show any significant reaccumulation.

## 2020-04-17 NOTE — PN- HOUSESTAFF
Problem: Safety  Goal: Will remain free from injury  Outcome: PROGRESSING AS EXPECTED  Goal: Will remain free from falls  Outcome: PROGRESSING AS EXPECTED     Fall precautions in place. Treaded socks on pt. Appropriate signs on doorway. IV pole on same side as bathroom. Bedrails up. Bed in lowest position and locked.  Call light and phone within reach. Patient educated on importance of calling nurses before getting out of bed, verbalizes understanding. Bed alarm on.    Problem: Infection  Goal: Will remain free from infection  Outcome: PROGRESSING AS EXPECTED     Assess for signs and symptoms of infection. Monitor vital signs and lab values. Implement standard precautions and hand washing before and after pt contact.    Isabel Vicente 18 0722:
Subjective
Follow-up For:
Acute on chronic heart failure with reduced ejection fraction, right-sided 
pleural effusion
Complaints: no complaints
Tele-Events Since Last Visit:
Normal sinus rhythm, first-degree AV block, heart rate 73-81
Subjective:
Patient seen and examined lying comfortably in bed this morning.  Patient 
reports that he feels much better than yesterday although his legs feel more 
swollen than yesterday.  No other complaints, no acute events.
 
Review of Systems
Constitutional:
Reports: see HPI. 
 
Objective
Last 24 Hrs of Vital Signs/I&O
 Vital Signs
 
 
Date Time Temp Pulse Resp B/P B/P Pulse O2 O2 Flow FiO2
 
     Mean Ox Delivery Rate 
 
 2033  80 18 130/86     
 
 1600       Room Air  
 
 1332 97.1 77 20 120/78  98 Room Air  
 
 0842    130/80     
 
 0841  72  130/80     
 
 0800      98 Nasal 3.0L 
 
       Cannula  
 
 0655 97.8 72 18 118/66  95 Room Air  
 
 0000       Nasal 3.0L 
 
       Cannula  
 
 
 Intake & Output
 
 
  1600  0800  0000
 
Intake Total 240  250
 
Output Total  400 600
 
Balance 240 -400 -350
 
    
 
Intake, Oral 240  250
 
Output, Urine  400 600
 
 
 
 
Physical Exam
General Appearance: Alert, Oriented X3, Cooperative, No Acute Distress
Skin: No Rashes, No Breakdown, No Significant Lesion
Skin Temp/Moisture Exam: Cool/Dry
Sepsis Skin Exam (color): Normal for Ethnicity
HEENT: Atraumatic
Neck: Supple
Cardiovascular: Regular Rate, Normal S1, Normal S2
Lungs: Normal Air Movement, mild basal crackles
Abdomen: Normal Bowel Sounds, Soft, tenderness in hypogastrium and lt iliac 
fossa
Neurological: Normal Gait, Normal Speech, Strength at 5/5 X4 Ext, Normal Tone
Extremities: No Clubbing, No Cyanosis, b/l l/e edema upto the knees
 
Assessment/Plan
Assessment:
53-year-old man with PMHx of CAD/MI/PCI with 8 stent placements, heart failure 
with low ejection fraction, HTN, HLD, NIDDM, who presented to the ED due to 
worsening of shortness of breath and lower extremity edema X 3days.
Vitals: stable. He is sating well on RA(off O2 now)
 
Problems:
1.  Acute on chronic heart failure with reduced ejection fraction.
2.  Moderate-sized right pleural effusion status post thoracentesis
3.  Coronary artery disease
4.  Hypokalemiaresolved
5. PNA-ruled out: No evidence of PNA on CXR, neg Gram stain and culture
 
Plan:
* Monitor on him on cardiac monitor
* Follow-up with Gram stain and culture of the pleural fluid
* Continue IV Lasix at 40 IV twice daily
* Daily weights I's and O's
* Monitor potassium and replete as necessary
* For the diabetes, Accu-Cheks 3 times a day and at bedtime and sliding scale 
insulin
* Patient with follow up with his cardiologist upon discharge for possible AICD 
placement
* Diet:Carb consistent
          DVT PX: ALPS, S/c heparin
          Code: full code
Problem List:
 1. Acute exacerbation of CHF (congestive heart failure)
 
 2. Hypokalemia
 
 3. Pleural effusion
 
Pain Ratin
Pain Location:
None
Pain Goal: Remain pain free
Pain Plan:
Follow pain pathway
Tomorrow's Labs & Rationales:
cbc, bep, mag
 
 
Tay Kelley 18 1243:
Attending MD Review Statement
 
Attending Statement
Attending MD Statement: examined this patient, discuss w/resident/PA/NP, agreed 
w/resident/PA/NP, discussed with family, reviewed EMR data (avail), discussed 
with nursing, discussed with case mgmt, reviewed images, amended to note
Attending Assessment/Plan:
No new complaints. Patient admitted here for Acute on chronic heart failure with
reduced ejection fraction with Moderate-sized right pleural effusion. Had IR 
tapped. Pleural fluid appears exudative mobilize criteria. F/u cytology results.
Continue diuresis as per cardiology. AICD placement as pe cardiology.